# Patient Record
Sex: FEMALE | Race: WHITE | Employment: UNEMPLOYED | URBAN - METROPOLITAN AREA
[De-identification: names, ages, dates, MRNs, and addresses within clinical notes are randomized per-mention and may not be internally consistent; named-entity substitution may affect disease eponyms.]

---

## 2017-03-01 LAB
ABO + RH BLD: NORMAL
ABO + RH BLD: NORMAL
BLD GP AB SCN SERPL QL: NORMAL
HBV SURFACE AG SERPL QL IA: NORMAL
RUBELLA ANTIBODY IGG QUANTITATIVE: NORMAL IU/ML
T PALLIDUM IGG SER QL: NORMAL

## 2017-09-12 LAB — GROUP B STREP PCR: NORMAL

## 2017-10-12 ENCOUNTER — HOSPITAL ENCOUNTER (INPATIENT)
Facility: CLINIC | Age: 27
LOS: 3 days | Discharge: HOME HEALTH | End: 2017-10-15
Attending: OBSTETRICS & GYNECOLOGY | Admitting: OBSTETRICS & GYNECOLOGY
Payer: COMMERCIAL

## 2017-10-12 ENCOUNTER — APPOINTMENT (OUTPATIENT)
Dept: ULTRASOUND IMAGING | Facility: CLINIC | Age: 27
End: 2017-10-12
Attending: OBSTETRICS & GYNECOLOGY
Payer: COMMERCIAL

## 2017-10-12 PROCEDURE — 76815 OB US LIMITED FETUS(S): CPT

## 2017-10-12 PROCEDURE — 12000029 ZZH R&B OB INTERMEDIATE

## 2017-10-12 PROCEDURE — 25000132 ZZH RX MED GY IP 250 OP 250 PS 637: Performed by: OBSTETRICS & GYNECOLOGY

## 2017-10-12 PROCEDURE — S0191 MISOPROSTOL, ORAL, 200 MCG: HCPCS | Performed by: OBSTETRICS & GYNECOLOGY

## 2017-10-12 RX ORDER — FERROUS SULFATE 325(65) MG
325 TABLET ORAL
COMMUNITY

## 2017-10-12 RX ORDER — PRENATAL VIT/IRON FUM/FOLIC AC 27MG-0.8MG
1 TABLET ORAL DAILY
COMMUNITY

## 2017-10-12 RX ORDER — HYDROXYZINE HYDROCHLORIDE 50 MG/1
50-100 TABLET, FILM COATED ORAL ONCE
Status: DISCONTINUED | OUTPATIENT
Start: 2017-10-12 | End: 2017-10-15 | Stop reason: HOSPADM

## 2017-10-12 RX ORDER — MISOPROSTOL 100 UG/1
25 TABLET ORAL
Status: DISCONTINUED | OUTPATIENT
Start: 2017-10-12 | End: 2017-10-13

## 2017-10-12 RX ADMIN — Medication 25 MCG: at 20:44

## 2017-10-12 RX ADMIN — Medication 25 MCG: at 18:52

## 2017-10-12 RX ADMIN — Medication 25 MCG: at 22:49

## 2017-10-12 RX ADMIN — Medication 25 MCG: at 16:44

## 2017-10-12 NOTE — IP AVS SNAPSHOT
MRN:[id number]                      After Visit Summary   10/12/2017    Brooke Vidal    MRN: [id number]           Thank you!     Thank you for choosing [facility] for your care. Our goal is always to provide you with excellent care. Hearing back from our patients is one way we can continue to improve our services. Please take a few minutes to complete the written survey that you may receive in the mail after you visit with us. Thank you!        Patient Information     Date Of Birth          1990        About your hospital stay     You were admitted on:  October 12, 2017 You last received care in the:  [address]    You were discharged on:  October 15, 2017       Who to Call     For medical emergencies, please call 911.  For non-urgent questions about your medical care, please call your primary care provider or clinic, [phone]          Attending Provider     Provider Specialty    Krista Stanley MD OB/Gyn    Marisel, Mychal De La Fuente MD OB/Gyn       Primary Care Provider Office Phone # Fax #    Mychal Joiner [phone]      Further instructions from your care team       Discharge Instructions    You should set up an appointment to see your doctor in 6 weeks after delivery for a postpartum exam.   You should also call if you develop any heavy vaginal bleeding worse than a menstrual period, or fever over 100.5 degrees, or vomiting or increased pain.    You should abstain from intercourse for six weeks after delivery. You should avoid a bath for 1 week after delivery but showering is ok. If you have any questions about yourself or the baby, feel free to call or if any urgent concerns after hours, please go to the emergency room.    Krista Stanley MD     Postpartum Vaginal Delivery Instructions    Activity       Ask family and friends for help when you need it.    Do not place anything in your vagina for 6 weeks.    You  are not restricted on other activities, but take it easy for a few weeks to allow your body to recover from delivery.  You are able to do any activities you feel up to that point.    No driving until you have stopped taking your pain medications (usually two weeks after delivery).     Call your health care provider if you have any of these symptoms:       Increased pain, swelling, redness, or fluid around your stiches from an episiotomy or perineal tear.    A fever above 100.4 F (38 C) with or without chills when placing a thermometer under your tongue.    You soak a sanitary pad with blood within 1 hour, or you see blood clots larger than a golf ball.    Bleeding that lasts more than 6 weeks.    Vaginal discharge that smells bad.    Severe pain, cramping or tenderness in your lower belly area.    A need to urinate more frequently (use the toilet more often), more urgently (use the toilet very quickly), or it burns when you urinate.    Nausea and vomiting.    Redness, swelling or pain around a vein in your leg.    Problems breastfeeding or a red or painful area on your breast.    Chest pain and cough or are gasping for air.    Problems coping with sadness, anxiety, or depression.  If you have any concerns about hurting yourself or the baby, call your provider immediately.     You have questions or concerns after you return home.     Keep your hands clean:  Always wash your hands before touching your perineal area and stitches.  This helps reduce your risk of infection.  If your hands aren't dirty, you may use an alcohol hand-rub to clean your hands. Keep your nails clean and short.        Pending Results     No orders found from 10/10/2017 to 10/13/2017.            Statement of Approval     Ordered          10/15/17 1028  I have reviewed and agree with all the recommendations and orders detailed in this document.  EFFECTIVE NOW     Approved and electronically signed by:  Krista Stanley MD            "  Admission Information     Date & Time Provider Department Dept. Phone    10/12/2017 Mychal Joiner MD [address] [phone]      Your Vitals Were     Blood Pressure Pulse Temperature Respirations Last Period Pulse Oximetry    113/70 77 98.7  F (37.1  C) (Oral) 16 2017 98%      MyChart Information     [facility] lets you send messages to your doctor, view your test results, renew your prescriptions, schedule appointments and more. To sign up, go to [email or web address] . Click on \"Log in\" on the left side of the screen, which will take you to the Welcome page. Then click on \"Sign up Now\" on the right side of the page.     You will be asked to enter the access code listed below, as well as some personal information. Please follow the directions to create your username and password.     Your access code is: [email or web address]  Expires: 2018 11:51 AM     Your access code will  in 90 days. If you need help or a new code, please call your [facility] clinic or [phone].        Care EveryWhere ID     This is your Care EveryWhere ID. This could be used by other organizations to access your [facility] medical records  [id number]        Equal Access to Services     ANNA JORDAN AH: Hadruddy bettso Sopolly, waaxda luqadaha, qaybta kaalmada adeegyada, fiona wright . So [facility] [phone].    ATENCIÓN: Si habla español, tiene a villarreal disposición servicios gratuitos de asistencia lingüística. Ramakrishnaame al [phone].    We comply with applicable federal civil rights laws and [place] laws. We do not discriminate on the basis of race, color, national origin, age, disability, sex, sexual orientation, or gender identity.               Review of your medicines      START taking        Dose / Directions    ibuprofen 600 MG tablet   Commonly known as:  ADVIL/MOTRIN        Dose:  600 mg   Take 1 tablet (600 mg) by mouth every 6 hours as needed for moderate " pain   Quantity:  90 tablet   Refills:  1         CONTINUE these medicines which have NOT CHANGED        Dose / Directions    ferrous sulfate 325 (65 FE) MG tablet   Commonly known as:  IRON        Dose:  325 mg   Take 325 mg by mouth daily (with breakfast)   Refills:  0       prenatal multivitamin plus iron 27-0.8 MG Tabs per tablet        Dose:  1 tablet   Take 1 tablet by mouth daily   Refills:  0            Where to get your medicines      Some of these will need a paper prescription and others can be bought over the counter. Ask your nurse if you have questions.     Bring a paper prescription for each of these medications     ibuprofen 600 MG tablet                Protect others around you: Learn how to safely use, store and throw away your medicines at [email or web address].             Medication List: This is a list of all your medications and when to take them. Check marks below indicate your daily home schedule. Keep this list as a reference.      Medications           Morning Afternoon Evening Bedtime As Needed    ferrous sulfate 325 (65 FE) MG tablet   Commonly known as:  IRON   Take 325 mg by mouth daily (with breakfast)                                ibuprofen 600 MG tablet   Commonly known as:  ADVIL/MOTRIN   Take 1 tablet (600 mg) by mouth every 6 hours as needed for moderate pain   Last time this was given:  800 mg on 10/15/2017 12:09 PM   Next Dose Due:  6:09 pm                                    prenatal multivitamin plus iron 27-0.8 MG Tabs per tablet   Take 1 tablet by mouth daily

## 2017-10-12 NOTE — IP AVS SNAPSHOT
[address]    [place] [address]    Phone:  [phone]                                       After Visit Summary   10/12/2017    Brooke Vidal    MRN: [id number]           After Visit Summary Signature Page     I have received my discharge instructions, and my questions have been answered. I have discussed any challenges I see with this plan with the nurse or doctor.    ..........................................................................................................................................  Patient/Patient Representative Signature      ..........................................................................................................................................  Patient Representative Print Name and Relationship to Patient    ..................................................               ................................................  Date                                            Time    ..........................................................................................................................................  Reviewed by Signature/Title    ...................................................              ..............................................  Date                                                            Time

## 2017-10-13 ENCOUNTER — ANESTHESIA (OUTPATIENT)
Dept: OBGYN | Facility: CLINIC | Age: 27
End: 2017-10-13
Payer: COMMERCIAL

## 2017-10-13 ENCOUNTER — ANESTHESIA EVENT (OUTPATIENT)
Dept: OBGYN | Facility: CLINIC | Age: 27
End: 2017-10-13
Payer: COMMERCIAL

## 2017-10-13 LAB
ABO + RH BLD: NORMAL
ABO + RH BLD: NORMAL
BLOOD BANK CMNT PATIENT-IMP: NORMAL
BLOOD BANK CMNT PATIENT-IMP: NORMAL
SPECIMEN EXP DATE BLD: NORMAL
T PALLIDUM IGG+IGM SER QL: NEGATIVE

## 2017-10-13 PROCEDURE — 86900 BLOOD TYPING SEROLOGIC ABO: CPT | Performed by: OBSTETRICS & GYNECOLOGY

## 2017-10-13 PROCEDURE — 25000132 ZZH RX MED GY IP 250 OP 250 PS 637: Performed by: OBSTETRICS & GYNECOLOGY

## 2017-10-13 PROCEDURE — 72200001 ZZH LABOR CARE VAGINAL DELIVERY SINGLE

## 2017-10-13 PROCEDURE — 86901 BLOOD TYPING SEROLOGIC RH(D): CPT | Performed by: OBSTETRICS & GYNECOLOGY

## 2017-10-13 PROCEDURE — 25000128 H RX IP 250 OP 636: Performed by: ANESTHESIOLOGY

## 2017-10-13 PROCEDURE — 37000011 ZZH ANESTHESIA WARD SERVICE

## 2017-10-13 PROCEDURE — 3E0R3BZ INTRODUCTION OF ANESTHETIC AGENT INTO SPINAL CANAL, PERCUTANEOUS APPROACH: ICD-10-PCS | Performed by: ANESTHESIOLOGY

## 2017-10-13 PROCEDURE — 0KQM0ZZ REPAIR PERINEUM MUSCLE, OPEN APPROACH: ICD-10-PCS | Performed by: OBSTETRICS & GYNECOLOGY

## 2017-10-13 PROCEDURE — 86780 TREPONEMA PALLIDUM: CPT | Performed by: OBSTETRICS & GYNECOLOGY

## 2017-10-13 PROCEDURE — 00HU33Z INSERTION OF INFUSION DEVICE INTO SPINAL CANAL, PERCUTANEOUS APPROACH: ICD-10-PCS | Performed by: ANESTHESIOLOGY

## 2017-10-13 PROCEDURE — 36415 COLL VENOUS BLD VENIPUNCTURE: CPT | Performed by: OBSTETRICS & GYNECOLOGY

## 2017-10-13 PROCEDURE — 25000125 ZZHC RX 250: Performed by: OBSTETRICS & GYNECOLOGY

## 2017-10-13 PROCEDURE — 12000037 ZZH R&B POSTPARTUM INTERMEDIATE

## 2017-10-13 PROCEDURE — 25000128 H RX IP 250 OP 636: Performed by: OBSTETRICS & GYNECOLOGY

## 2017-10-13 RX ORDER — NALOXONE HYDROCHLORIDE 0.4 MG/ML
.1-.4 INJECTION, SOLUTION INTRAMUSCULAR; INTRAVENOUS; SUBCUTANEOUS
Status: DISCONTINUED | OUTPATIENT
Start: 2017-10-13 | End: 2017-10-13

## 2017-10-13 RX ORDER — ONDANSETRON 4 MG/1
4 TABLET, ORALLY DISINTEGRATING ORAL EVERY 6 HOURS PRN
Status: DISCONTINUED | OUTPATIENT
Start: 2017-10-13 | End: 2017-10-13

## 2017-10-13 RX ORDER — ACETAMINOPHEN 325 MG/1
650 TABLET ORAL EVERY 4 HOURS PRN
Status: DISCONTINUED | OUTPATIENT
Start: 2017-10-13 | End: 2017-10-15 | Stop reason: HOSPADM

## 2017-10-13 RX ORDER — LANOLIN 100 %
OINTMENT (GRAM) TOPICAL
Status: DISCONTINUED | OUTPATIENT
Start: 2017-10-13 | End: 2017-10-15 | Stop reason: HOSPADM

## 2017-10-13 RX ORDER — OXYCODONE AND ACETAMINOPHEN 5; 325 MG/1; MG/1
1 TABLET ORAL
Status: DISCONTINUED | OUTPATIENT
Start: 2017-10-13 | End: 2017-10-13

## 2017-10-13 RX ORDER — ROPIVACAINE HYDROCHLORIDE 2 MG/ML
10 INJECTION, SOLUTION EPIDURAL; INFILTRATION; PERINEURAL ONCE
Status: COMPLETED | OUTPATIENT
Start: 2017-10-13 | End: 2017-10-13

## 2017-10-13 RX ORDER — ONDANSETRON 2 MG/ML
4 INJECTION INTRAMUSCULAR; INTRAVENOUS EVERY 6 HOURS PRN
Status: DISCONTINUED | OUTPATIENT
Start: 2017-10-13 | End: 2017-10-13

## 2017-10-13 RX ORDER — LIDOCAINE 40 MG/G
CREAM TOPICAL
Status: DISCONTINUED | OUTPATIENT
Start: 2017-10-13 | End: 2017-10-13

## 2017-10-13 RX ORDER — AMOXICILLIN 250 MG
1-2 CAPSULE ORAL 2 TIMES DAILY
Status: DISCONTINUED | OUTPATIENT
Start: 2017-10-13 | End: 2017-10-15 | Stop reason: HOSPADM

## 2017-10-13 RX ORDER — HYDROCORTISONE 2.5 %
CREAM (GRAM) TOPICAL 3 TIMES DAILY PRN
Status: DISCONTINUED | OUTPATIENT
Start: 2017-10-13 | End: 2017-10-15 | Stop reason: HOSPADM

## 2017-10-13 RX ORDER — SODIUM CHLORIDE, SODIUM LACTATE, POTASSIUM CHLORIDE, CALCIUM CHLORIDE 600; 310; 30; 20 MG/100ML; MG/100ML; MG/100ML; MG/100ML
INJECTION, SOLUTION INTRAVENOUS CONTINUOUS
Status: DISCONTINUED | OUTPATIENT
Start: 2017-10-13 | End: 2017-10-13

## 2017-10-13 RX ORDER — HYDROCODONE BITARTRATE AND ACETAMINOPHEN 5; 325 MG/1; MG/1
1-2 TABLET ORAL EVERY 4 HOURS PRN
Status: DISCONTINUED | OUTPATIENT
Start: 2017-10-13 | End: 2017-10-15 | Stop reason: HOSPADM

## 2017-10-13 RX ORDER — BISACODYL 10 MG
10 SUPPOSITORY, RECTAL RECTAL DAILY PRN
Status: DISCONTINUED | OUTPATIENT
Start: 2017-10-15 | End: 2017-10-15 | Stop reason: HOSPADM

## 2017-10-13 RX ORDER — EPHEDRINE SULFATE 50 MG/ML
5 INJECTION, SOLUTION INTRAMUSCULAR; INTRAVENOUS; SUBCUTANEOUS
Status: DISCONTINUED | OUTPATIENT
Start: 2017-10-13 | End: 2017-10-13

## 2017-10-13 RX ORDER — OXYTOCIN/0.9 % SODIUM CHLORIDE 30/500 ML
1-24 PLASTIC BAG, INJECTION (ML) INTRAVENOUS CONTINUOUS
Status: DISCONTINUED | OUTPATIENT
Start: 2017-10-13 | End: 2017-10-13

## 2017-10-13 RX ORDER — IBUPROFEN 400 MG/1
400-800 TABLET, FILM COATED ORAL EVERY 6 HOURS PRN
Status: DISCONTINUED | OUTPATIENT
Start: 2017-10-13 | End: 2017-10-15 | Stop reason: HOSPADM

## 2017-10-13 RX ORDER — METHYLERGONOVINE MALEATE 0.2 MG/ML
200 INJECTION INTRAVENOUS
Status: DISCONTINUED | OUTPATIENT
Start: 2017-10-13 | End: 2017-10-13

## 2017-10-13 RX ORDER — OXYTOCIN/0.9 % SODIUM CHLORIDE 30/500 ML
100 PLASTIC BAG, INJECTION (ML) INTRAVENOUS CONTINUOUS
Status: DISCONTINUED | OUTPATIENT
Start: 2017-10-13 | End: 2017-10-15 | Stop reason: HOSPADM

## 2017-10-13 RX ORDER — ACETAMINOPHEN 325 MG/1
650 TABLET ORAL EVERY 4 HOURS PRN
Status: DISCONTINUED | OUTPATIENT
Start: 2017-10-13 | End: 2017-10-13

## 2017-10-13 RX ORDER — OXYTOCIN 10 [USP'U]/ML
10 INJECTION, SOLUTION INTRAMUSCULAR; INTRAVENOUS
Status: DISCONTINUED | OUTPATIENT
Start: 2017-10-13 | End: 2017-10-13

## 2017-10-13 RX ORDER — OXYTOCIN/0.9 % SODIUM CHLORIDE 30/500 ML
100-340 PLASTIC BAG, INJECTION (ML) INTRAVENOUS CONTINUOUS PRN
Status: DISCONTINUED | OUTPATIENT
Start: 2017-10-13 | End: 2017-10-13

## 2017-10-13 RX ORDER — MISOPROSTOL 200 UG/1
400 TABLET ORAL
Status: DISCONTINUED | OUTPATIENT
Start: 2017-10-13 | End: 2017-10-15 | Stop reason: HOSPADM

## 2017-10-13 RX ORDER — OXYTOCIN 10 [USP'U]/ML
10 INJECTION, SOLUTION INTRAMUSCULAR; INTRAVENOUS
Status: DISCONTINUED | OUTPATIENT
Start: 2017-10-13 | End: 2017-10-15 | Stop reason: HOSPADM

## 2017-10-13 RX ORDER — NALBUPHINE HYDROCHLORIDE 10 MG/ML
2.5-5 INJECTION, SOLUTION INTRAMUSCULAR; INTRAVENOUS; SUBCUTANEOUS EVERY 6 HOURS PRN
Status: DISCONTINUED | OUTPATIENT
Start: 2017-10-13 | End: 2017-10-13

## 2017-10-13 RX ORDER — HYDROMORPHONE HYDROCHLORIDE 1 MG/ML
.3-.5 INJECTION, SOLUTION INTRAMUSCULAR; INTRAVENOUS; SUBCUTANEOUS EVERY 30 MIN PRN
Status: DISCONTINUED | OUTPATIENT
Start: 2017-10-13 | End: 2017-10-15 | Stop reason: HOSPADM

## 2017-10-13 RX ORDER — CARBOPROST TROMETHAMINE 250 UG/ML
250 INJECTION, SOLUTION INTRAMUSCULAR
Status: DISCONTINUED | OUTPATIENT
Start: 2017-10-13 | End: 2017-10-13

## 2017-10-13 RX ORDER — NALOXONE HYDROCHLORIDE 0.4 MG/ML
.1-.4 INJECTION, SOLUTION INTRAMUSCULAR; INTRAVENOUS; SUBCUTANEOUS
Status: DISCONTINUED | OUTPATIENT
Start: 2017-10-13 | End: 2017-10-15 | Stop reason: HOSPADM

## 2017-10-13 RX ORDER — IBUPROFEN 400 MG/1
800 TABLET, FILM COATED ORAL
Status: DISCONTINUED | OUTPATIENT
Start: 2017-10-13 | End: 2017-10-13

## 2017-10-13 RX ORDER — OXYTOCIN/0.9 % SODIUM CHLORIDE 30/500 ML
340 PLASTIC BAG, INJECTION (ML) INTRAVENOUS CONTINUOUS PRN
Status: DISCONTINUED | OUTPATIENT
Start: 2017-10-13 | End: 2017-10-15 | Stop reason: HOSPADM

## 2017-10-13 RX ADMIN — Medication 12 ML/HR: at 02:13

## 2017-10-13 RX ADMIN — IBUPROFEN 800 MG: 400 TABLET ORAL at 18:47

## 2017-10-13 RX ADMIN — SODIUM CHLORIDE, POTASSIUM CHLORIDE, SODIUM LACTATE AND CALCIUM CHLORIDE 1000 ML: 600; 310; 30; 20 INJECTION, SOLUTION INTRAVENOUS at 01:00

## 2017-10-13 RX ADMIN — SENNOSIDES AND DOCUSATE SODIUM 1 TABLET: 8.6; 5 TABLET ORAL at 19:39

## 2017-10-13 RX ADMIN — ACETAMINOPHEN 650 MG: 325 TABLET, FILM COATED ORAL at 19:39

## 2017-10-13 RX ADMIN — OXYTOCIN-SODIUM CHLORIDE 0.9% IV SOLN 30 UNIT/500ML 2 MILLI-UNITS/MIN: 30-0.9/5 SOLUTION at 02:40

## 2017-10-13 RX ADMIN — SODIUM CHLORIDE, POTASSIUM CHLORIDE, SODIUM LACTATE AND CALCIUM CHLORIDE: 600; 310; 30; 20 INJECTION, SOLUTION INTRAVENOUS at 02:13

## 2017-10-13 RX ADMIN — ROPIVACAINE HYDROCHLORIDE 10 ML: 2 INJECTION, SOLUTION EPIDURAL; INFILTRATION at 01:54

## 2017-10-13 NOTE — PROGRESS NOTES
Data: Brooke Vidal transferred to [facility] via wheelchair at 1200. Baby transferred via parent's arms.  Action: Receiving unit notified of transfer: Yes. Patient and family notified of room change. Report given to Brenda Moralez at 1205. Belongings sent to receiving unit. Accompanied by Registered Nurse. Oriented patient to surroundings. Call light within reach. ID bands double-checked with receiving RN.  Response: Patient tolerated transfer and is stable. Ambulated well and able to void.

## 2017-10-13 NOTE — PLAN OF CARE
Dr. Joiner calls in- updated on patient status of feeling crampy.  Plan- continue PO cytotec and reevaluate in AM.  Sleep and pain meds when indicated.  Call for questions or concerns.

## 2017-10-13 NOTE — LACTATION NOTE
Initial Lactation visit. Hand out given. Recommend unlimited, frequent breast feedings: At least 8 - 12 times every 24 hours. Avoid pacifiers and supplementation with formula unless medically indicated. Explained benefits of holding baby skin on skin to help promote better breastfeeding outcomes. Infant feeding at time of visit.  Assisted Brooke with getting baby to latch better as she was shallow.  Encouraged Brooke to hold both her breast and baby to get her to latch better initially.  Will revisit as needed.    Erna Colorado RN, IBCLC

## 2017-10-13 NOTE — PLAN OF CARE
Problem: Patient Care Overview  Goal: Plan of Care/Patient Progress Review  Outcome: No Change  Pt. admitted from L&D  via Wheelchair  and transferred to bed with sba. Pt. arrived with baby and was accompanied by  and arrived with personal belongings. Report was taken from Beryl Saenz rn in L&D. VS stable. Fundus is firm and midline.  Vaginal bleeding is scant.  SL in left forearm.  Pt. oriented to the room and call light system.

## 2017-10-13 NOTE — PROGRESS NOTES
of viable male at 0857   Baby's weight 9lbs. 13oz.     Apgars 8,9,      Placenta intact, 3vc   2nd degree lac repaired with 3-0 vicryl   Epidural anesthesia   No complications

## 2017-10-13 NOTE — PLAN OF CARE
ZOILA paged for epidural at 0130. Dr. Angulo in room at 0140. Medications, Ropivacaine, handed off to ZOILA at this time.  Time out performed.  Pt sat up at edge of bed. Ana in place during procedure. Pt tolerated procedure well. Into left tilt position at 0200. BP set for every 2 minutes with continuous pulse oximeter in place.

## 2017-10-13 NOTE — H&P
[facility] Labor and Delivery History and Physical    Brooke Vidal MRN# [id number]   Age: 27 year old YOB: 1990     Date of Admission:  10/12/2017    Primary care provider: Mychal Joiner           Chief Complaint:   Brooke Vidal is a 27 year old female who is 40w2d pregnant and being admitted for PROM. She received PO cytotec 25 mcg every 2 hours until about 1 am then was started on pitocin at 1 am. She reached a max dose of 8 mu and had an epidural at around 2 am. She is now complete.          Pregnancy history:     OBSTETRIC HISTORY:    Obstetric History       T0      L0     SAB0   TAB0   Ectopic0   Multiple0   Live Births0       # Outcome Date GA Lbr Brennon/2nd Weight Sex Delivery Anes PTL Lv   1 Current                   EDC: Estimated Date of Delivery: Oct 11, 2017    Prenatal Labs:   Lab Results   Component Value Date    ABO A 10/13/2017    RH Neg 10/13/2017    AS NEG 2017    HEPBANG NEG 2017    TREPAB NR 2017       GBS Status:   Lab Results   Component Value Date    GBS NEG 2017       Active Problem List  Patient Active Problem List   Diagnosis     Indication for care in labor or delivery       Medication Prior to Admission  Prescriptions Prior to Admission   Medication Sig Dispense Refill Last Dose     Prenatal Vit-Fe Fumarate-FA (PRENATAL MULTIVITAMIN PLUS IRON) 27-0.8 MG TABS per tablet Take 1 tablet by mouth daily        ferrous sulfate (IRON) 325 (65 FE) MG tablet Take 325 mg by mouth daily (with breakfast)      .        Maternal Past Medical History:   History reviewed. No pertinent past medical history.                    Family History:   This patient has no significant family history            Social History:     Social History   Substance Use Topics     Smoking status: Never Smoker     Smokeless tobacco: Never Used     Alcohol use No            Review of Systems:   C: NEGATIVE for fever, chills, change in weight  E/M:  NEGATIVE for ear, mouth and throat problems  R: NEGATIVE for significant cough or SOB  CV: NEGATIVE for chest pain, palpitations or peripheral edema          Physical Exam:   Vitals were reviewed    Constitutional: Awake, alert, cooperative, no apparent distress, and appears stated age.  Lungs: No increased work of breathing, good air exchange, clear to auscultation bilaterally, no crackles or wheezing.  Cardiovascular: Regular rate and rhythm, normal S1 and S2, no S3 or S4, and no murmur noted.  Abdomen: No scars, normal bowel sounds, soft, non-distended, non-tender, no masses palpated, no hepatosplenomegally.  Genitourinary: No urethral discharge, normal external genitalia, no hernia.  Neuropsychiatric: Normal affect, mood, orientation, memory and insight.  Skin: No rashes, erythema, pallor, petechia or purpura.     Cervix:   Membranes: PROM   Dilation: 10   Effacement: 100%   Station:+2    Presentation:Vertex  Fetal Heart Rate Tracing: Tier 1 (normal)  Tocometer: external monitor                       Assessment:   Brooke Vidal is a 40w2d pregnant female admitted with induction of labor, indication PROM.          Plan:   Anticipate     Mychal Joiner MD

## 2017-10-13 NOTE — ANESTHESIA PROCEDURE NOTES
Peripheral nerve/Neuraxial procedure note : epidural catheter  Pre-Procedure  Performed by TANA ALONSO  Referred by KANDIS SOSA  Location: OB      Pre-Anesthestic Checklist: patient identified, IV checked, risks and benefits discussed, informed consent, monitors and equipment checked, pre-op evaluation and at physician/surgeon's request    Timeout  Correct Patient: Yes   Correct Procedure: Yes   Correct Site: Yes   Correct Laterality: N/A   Correct Position: Yes   Site Marked: N/A   .   Procedure Documentation    .    Procedure:    Epidural catheter.  Insertion Site:L2-3  (midline approach) Injection technique: LORT saline   Local skin infiltrated with mL of 1% lidocaine.  JEANNA at 4 cm     Patient Prep;mask, sterile gloves, povidone-iodine 7.5% surgical scrub, patient draped.  .  Needle: Touhy needle Needle Gauge: 17.    Needle Length (Inches) 3.5  # of attempts: 1 and # of redirects:  .   . .  Catheter threaded easily  3 cm epidural space.  .   .    Assessment/Narrative  Paresthesias: No.  .  .  Aspiration negative for heme or CSF  . Test dose of 3 mL lidocaine 1.5% w/ 1:200,000 epinephrine at. Test dose negative for signs of intravascular, subdural or intrathecal injection. Comments:  No complications. Sterile Dressing.

## 2017-10-13 NOTE — PLAN OF CARE
Problem: Labor (Cervical Ripen, Induct, Augment) (Adult,Obstetrics,Pediatric)  Goal: Signs and Symptoms of Listed Potential Problems Will be Absent, Minimized or Managed (Labor)  Signs and symptoms of listed potential problems will be absent, minimized or managed by discharge/transition of care (reference Labor (Cervical Ripen, Induct, Augment) (Adult,Obstetrics,Pediatric) CPG).   Outcome: Improving  Pt. Resting comfortably with epidural. SVE: 8/90/0, Category II tracing, with periods of marked variability, FHT 100s-140s and intermittent early decels. Pitocin @ 8mu.

## 2017-10-14 PROCEDURE — 25000132 ZZH RX MED GY IP 250 OP 250 PS 637: Performed by: OBSTETRICS & GYNECOLOGY

## 2017-10-14 PROCEDURE — 12000037 ZZH R&B POSTPARTUM INTERMEDIATE

## 2017-10-14 RX ADMIN — ACETAMINOPHEN 650 MG: 325 TABLET, FILM COATED ORAL at 00:18

## 2017-10-14 RX ADMIN — ACETAMINOPHEN 650 MG: 325 TABLET, FILM COATED ORAL at 17:34

## 2017-10-14 RX ADMIN — ACETAMINOPHEN 650 MG: 325 TABLET, FILM COATED ORAL at 11:59

## 2017-10-14 RX ADMIN — IBUPROFEN 800 MG: 400 TABLET ORAL at 17:34

## 2017-10-14 RX ADMIN — ACETAMINOPHEN 650 MG: 325 TABLET, FILM COATED ORAL at 05:48

## 2017-10-14 RX ADMIN — IBUPROFEN 800 MG: 400 TABLET ORAL at 23:13

## 2017-10-14 RX ADMIN — SENNOSIDES AND DOCUSATE SODIUM 1 TABLET: 8.6; 5 TABLET ORAL at 09:06

## 2017-10-14 RX ADMIN — IBUPROFEN 800 MG: 400 TABLET ORAL at 05:48

## 2017-10-14 RX ADMIN — IBUPROFEN 800 MG: 400 TABLET ORAL at 11:59

## 2017-10-14 RX ADMIN — ACETAMINOPHEN 650 MG: 325 TABLET, FILM COATED ORAL at 23:13

## 2017-10-14 RX ADMIN — IBUPROFEN 800 MG: 400 TABLET ORAL at 00:18

## 2017-10-14 NOTE — PROGRESS NOTES
[facility]       DAILY NOTE - POSTPARTUM DAY 1     SUBJECTIVE:     Pain controlled? Yes  Tolerating a regular diet? YES  Ambulating? YES  Voiding without difficulty? Yes    OBJECTIVE:  Vitals:    10/13/17 1200 10/13/17 1500 10/13/17 2211 10/14/17 0903   BP: 117/84 114/77 118/77 104/57   Pulse:  94  62   Resp: 18 18 18 16   Temp: 98.2  F (36.8  C) 98.2  F (36.8  C) 97.7  F (36.5  C) 98.3  F (36.8  C)   TempSrc: Oral Oral Oral Oral   SpO2:           Constitutional: healthy, alert and no distress    Abdomen:  Uterine fundus is firm, non-tender and at the level of the umbilicus       LABS:  No results found for: HGB    ASSESSMENT:  Post-partum day #1 s/p   Pregnancy complicated by NO COMPLICATIONS    Doing well.       PLAN:   Continue routine postpartum cares    Mychal Joiner

## 2017-10-14 NOTE — PLAN OF CARE
Problem: Patient Care Overview  Goal: Plan of Care/Patient Progress Review  Outcome: Improving  Vss, fundus firm with scant flow. Wearing ice and tucks to juanpablo area. Pain managed with tylenol and ibuprofen. Breast feeding  well and pumping after feedings. Encouraged to call with questions/needs.

## 2017-10-14 NOTE — LACTATION NOTE
Follow up visit. Infant sleepy overnight.  Started supplement overnight as blood sugars were lower.  Latched and fed well this morning.  Encouraged Brooke to pump until not needing to supplement.  Will continue to follow as needed.  Erna Colorado  RN, IBCLC

## 2017-10-14 NOTE — PLAN OF CARE
Problem: Patient Care Overview  Goal: Plan of Care/Patient Progress Review  Outcome: Improving  Patient doing well overnight. Taking tylenol and ibuprofen for cramping. Hot pack given for comfort. Using ice and tucks on perineum. Breast feeding infant and pumping afterward.

## 2017-10-14 NOTE — ANESTHESIA POSTPROCEDURE EVALUATION
Patient: Brooke Vidal    * No procedures listed *    Diagnosis:* No pre-op diagnosis entered *  Diagnosis Additional Information: No value filed.    Anesthesia Type:  No value filed.    Note:  Anesthesia Post Evaluation    Patient location during evaluation: floor  Patient participation: Able to fully participate in evaluation  Level of consciousness: awake and alert  Pain management: adequate  Airway patency: patent  Cardiovascular status: acceptable  Respiratory status: acceptable     Anesthetic complications: None    Comments: Epidural has worn off and no complications        Last vitals:  Vitals:    10/13/17 1500 10/13/17 2211 10/14/17 0903   BP: 114/77 118/77 104/57   Pulse: 94  62   Resp: 18 18 16   Temp: 36.8  C (98.2  F) 36.5  C (97.7  F) 36.8  C (98.3  F)   SpO2:            Electronically Signed By: Andres Suero MD  October 14, 2017  1:42 PM

## 2017-10-15 VITALS
RESPIRATION RATE: 16 BRPM | HEART RATE: 77 BPM | TEMPERATURE: 98.7 F | SYSTOLIC BLOOD PRESSURE: 113 MMHG | OXYGEN SATURATION: 98 % | DIASTOLIC BLOOD PRESSURE: 70 MMHG

## 2017-10-15 PROCEDURE — 25000132 ZZH RX MED GY IP 250 OP 250 PS 637: Performed by: OBSTETRICS & GYNECOLOGY

## 2017-10-15 RX ORDER — IBUPROFEN 600 MG/1
600 TABLET, FILM COATED ORAL EVERY 6 HOURS PRN
Qty: 90 TABLET | Refills: 1 | Status: ON HOLD | OUTPATIENT
Start: 2017-10-15 | End: 2020-06-17

## 2017-10-15 RX ADMIN — IBUPROFEN 800 MG: 400 TABLET ORAL at 05:07

## 2017-10-15 RX ADMIN — IBUPROFEN 800 MG: 400 TABLET ORAL at 12:09

## 2017-10-15 RX ADMIN — ACETAMINOPHEN 650 MG: 325 TABLET, FILM COATED ORAL at 05:07

## 2017-10-15 RX ADMIN — SENNOSIDES AND DOCUSATE SODIUM 1 TABLET: 8.6; 5 TABLET ORAL at 08:20

## 2017-10-15 RX ADMIN — ACETAMINOPHEN 650 MG: 325 TABLET, FILM COATED ORAL at 12:09

## 2017-10-15 NOTE — PROGRESS NOTES
Brooke Vidal  October 15, 2017  PPD#3 s/p     S: Pt doing well with no acute events overnight.  Pain well controlled;  ambulating without difficulty; tolerating po intake; passing flatus.  Denies SOB, CP, HA, nausea/vomiting, fevers/chills.  Decreased lochia.  Breast/Bottle feeding without difficulty.    O: /70  Pulse 77  Temp 98.7  F (37.1  C) (Oral)  Resp 16  LMP 2017  SpO2 98%  Breastfeeding? Unknown  No results for input(s): HGB in the last 168 hours.  Abdomen: soft, non tender, fundus firm 2 cm below the umbilicus  Ext: non tender, no edema or erythema    A/P: s/p  PPD #2 - doing well  Continue routine post partum care  Discussed contraceptive options, which will be readdressed at 6 week post-partum appointment.  Discharge planning for today.    Krista Stanley MD

## 2017-10-15 NOTE — DISCHARGE INSTRUCTIONS
Discharge Instructions    You should set up an appointment to see your doctor in 6 weeks after delivery for a postpartum exam.   You should also call if you develop any heavy vaginal bleeding worse than a menstrual period, or fever over 100.5 degrees, or vomiting or increased pain.    You should abstain from intercourse for six weeks after delivery. You should avoid a bath for 1 week after delivery but showering is ok. If you have any questions about yourself or the baby, feel free to call or if any urgent concerns after hours, please go to the emergency room.    Krista Stanley MD     Postpartum Vaginal Delivery Instructions    Activity       Ask family and friends for help when you need it.    Do not place anything in your vagina for 6 weeks.    You are not restricted on other activities, but take it easy for a few weeks to allow your body to recover from delivery.  You are able to do any activities you feel up to that point.    No driving until you have stopped taking your pain medications (usually two weeks after delivery).     Call your health care provider if you have any of these symptoms:       Increased pain, swelling, redness, or fluid around your stiches from an episiotomy or perineal tear.    A fever above 100.4 F (38 C) with or without chills when placing a thermometer under your tongue.    You soak a sanitary pad with blood within 1 hour, or you see blood clots larger than a golf ball.    Bleeding that lasts more than 6 weeks.    Vaginal discharge that smells bad.    Severe pain, cramping or tenderness in your lower belly area.    A need to urinate more frequently (use the toilet more often), more urgently (use the toilet very quickly), or it burns when you urinate.    Nausea and vomiting.    Redness, swelling or pain around a vein in your leg.    Problems breastfeeding or a red or painful area on your breast.    Chest pain and cough or are gasping for air.    Problems coping with sadness,  anxiety, or depression.  If you have any concerns about hurting yourself or the baby, call your provider immediately.     You have questions or concerns after you return home.     Keep your hands clean:  Always wash your hands before touching your perineal area and stitches.  This helps reduce your risk of infection.  If your hands aren't dirty, you may use an alcohol hand-rub to clean your hands. Keep your nails clean and short.

## 2017-10-15 NOTE — PLAN OF CARE
Problem: Patient Care Overview  Goal: Plan of Care/Patient Progress Review  Outcome: No Change  Patient doing well overnight. Taking tylenol and ibuprofen for cramping. Warm packs provided for comfort. Breast feeding infant on demand. Anticipate discharge later today.

## 2017-10-15 NOTE — PLAN OF CARE
Problem: Patient Care Overview  Goal: Plan of Care/Patient Progress Review  Patient meeting expected goals for this shift.  Using ibuprofen, tylenol & heat for pain/comfort.  Independent in all self and  cares.  Working on breastfeeding .   present at bedside and supportive.  Positive bonding behaviors observed.  Continue to monitor and notify MD as needed.

## 2017-10-25 NOTE — L&D DELIVERY NOTE
IDENTIFICATION:  Brooke Fernández is a 27-year-old,  1, para 0, who presents to [facility] on 10/12/2017 with premature rupture of membranes.  This happened at around noon.        HOSPITAL COURSE:  She arrived in the afternoon and was confirmed ruptured.  She was given oral Cytotec 25 mcg every 2 hours starting at about 8:00 p.m.  At around 1:00 a.m., she was started on Pitocin.        She had an uncomplicated prenatal course.  Her were are A negative, antibody negative, RPR nonreactive, HIV negative, rubella immune, hepatitis B negative.  GBS is negative.        She reached a maximum dose of 8 milliunits.  She was complete at around 8:00 a.m.  She pushed for about 1 hour.  The bed was broken down.  Perineum was cleansed.  She had a normal spontaneous vaginal delivery of a male infant at 8:57 a.m.  The weight was 9 pounds 13 ounces, Apgars 8 at 1 minute, 9 at 5 minutes.  She had a secondary laceration which was treated repaired with 3-0 Vicryl in layers.  She had an epidural anesthesia, which worked well for her repair.  Her placenta was delivered spontaneously intact with a 3-vessel cord.  There were no complications.  She and the baby were both stable postpartum.         SHENA MARQUIS MD             D: 10/24/2017 15:22   T: 10/24/2017 22:06   MT: [id number]      Name:     BROOKE FERNÁNDEZ   MRN:      [id number]        Account:        [id number]   :      1990           Delivery Date:  10/13/2017      Document: [id number]

## 2018-01-28 ENCOUNTER — HEALTH MAINTENANCE LETTER (OUTPATIENT)
Age: 28
End: 2018-01-28

## 2019-02-14 NOTE — PLAN OF CARE
LUCENTIS AND REPEAT EVERY 5 WKS X 4 - TRACE SRF AT 5 WKS - IMPROVING COMPARED TO 6 WKS ON 12 11 17. Problem: Patient Care Overview  Goal: Plan of Care/Patient Progress Review  Outcome: Adequate for Discharge Date Met:  10/15/17  D: VSS, assessments WDL.   I: Pt. received complete discharge paperwork and home medications as filled by discharge pharmacy to include prenatal vitamin, iron and ibuprofen.   Pt. was given times of last dose for all discharge medications in writing on discharge medication sheets. Patient renting breat pump. Discharge teaching included home medication, pain management, activity restrictions, postpartum cares, and signs and symptoms of infection.    A: Discharge outcomes on care plan met.  Mother states understanding and comfort with self and baby cares.  P: Pt. discharged to home.  Pt. was discharged with baby, and bands were checked at time of discharge.  Pt. was accompanied by , nurse and baby, and left with personal belongings.  Home care ordered.  Pt. to follow up with OB per MD order.  Pt. had no further questions at the time of discharge and no unmet needs were identified.

## 2019-12-03 LAB
HBV SURFACE AG SERPL QL IA: NEGATIVE
HIV 1+2 AB+HIV1 P24 AG SERPL QL IA: NEGATIVE
RUBELLA ANTIBODY IGG QUANTITATIVE: NORMAL IU/ML

## 2019-12-11 ENCOUNTER — TELEPHONE (OUTPATIENT)
Dept: MATERNAL FETAL MEDICINE | Facility: CLINIC | Age: 29
End: 2019-12-11

## 2019-12-11 ENCOUNTER — OFFICE VISIT (OUTPATIENT)
Dept: MATERNAL FETAL MEDICINE | Facility: CLINIC | Age: 29
End: 2019-12-11
Attending: OBSTETRICS & GYNECOLOGY
Payer: COMMERCIAL

## 2019-12-11 ENCOUNTER — PRE VISIT (OUTPATIENT)
Dept: MATERNAL FETAL MEDICINE | Facility: CLINIC | Age: 29
End: 2019-12-11

## 2019-12-11 ENCOUNTER — HOSPITAL ENCOUNTER (OUTPATIENT)
Dept: ULTRASOUND IMAGING | Facility: CLINIC | Age: 29
Discharge: HOME | End: 2019-12-11
Attending: OBSTETRICS & GYNECOLOGY | Admitting: OBSTETRICS & GYNECOLOGY
Payer: COMMERCIAL

## 2019-12-11 DIAGNOSIS — O28.1 ABNORMAL BIOCHEMICAL FINDING ON ANTENATAL SCREENING OF MOTHER: Primary | ICD-10-CM

## 2019-12-11 DIAGNOSIS — O35.8XX0 CYSTIC HYGROMA OF FETUS IN SINGLETON PREGNANCY: ICD-10-CM

## 2019-12-11 DIAGNOSIS — O26.90 PREGNANCY RELATED CONDITION, ANTEPARTUM: ICD-10-CM

## 2019-12-11 DIAGNOSIS — O35.10X1 MATERNAL CARE FOR SUSPECTED CHROMOSOMAL ABNORMALITY IN FETUS, FETUS 1: ICD-10-CM

## 2019-12-11 PROCEDURE — 96040 ZZH GENETIC COUNSELING, EACH 30 MINUTES: CPT | Mod: ZF | Performed by: GENETIC COUNSELOR, MS

## 2019-12-11 PROCEDURE — 76813 OB US NUCHAL MEAS 1 GEST: CPT

## 2019-12-11 NOTE — PROGRESS NOTES
[facility]  Genetic Counseling Consult    Patient: Brooke Vidal YOB: 1990   Date of Service: 19      Brooke Vidal was seen at [facility] [place] for genetic consultation to discuss the options for screening and testing for fetal chromosome abnormalities.  The indication for genetic counseling is NIPT positive for monosomy X.  Daniel was accompanied to her appointment today by her  Blaine.       Impression/Plan:   1.  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  This test result has a positive predictive value of ~50%.  We discussed a brief overview of the common features associated with Owens syndrome, as well as the increased risk for miscarriage for affected pregnancies.    2.  Daniel had a nuchal translucency ultrasound today which identified a cystic hygroma.  This ultrasound finding has a high association with chromosome abnormalities, in particular Owens syndrome.  Please see ultrasound report for full details.      3.  Daniel and Blaine were undecided regarding appropriate steps moving forward for them.  We discussed options including diagnostic testing and increased ultrasound surveillance.  The couple will contact our clinic to discuss and arrange care as needed.      4.  Daniel completed a consent to communicate with her  Blaine in case he has questions or concerns he would like addressed moving forward, and this document was scanned into her medical record.      Pregnancy History:   /Parity:    Age at Delivery: 30 year old  ÓSCAR: 2020, by Last Menstrual Period  Gestational Age: 12w4d    No significant complications or exposures were reported in the current pregnancy prior to the abnormal NIPT result.    Brooke farmer pregnancy history is significant for 1 prior full term pregnancy with no reported complications.    Risk Assessment for  "Chromosome Conditions:     We reviewed that this Brooke had a Non-Invasive Prenatal Test (NIPT) (Panorama) earlier in pregnancy.  The results are \"ANEUPLOIDY DETECTED\" for Monosomy X (Wilburn Syndrome). The results are normal for 18,13, and 21 (no aneuploidy detected).  We reviewed that NIPT is a screening test, not a diagnostic test, meaning that it does not confirm or rule out any condition.  We discussed that the purpose of NIPT is to identify pregnancies that are at greater risk for having the chromosome conditions assessed.  For a positive NIPT result, there is a statistical calculation that can be used to provide an estimate for the risks for the pregnancy to be affected called a positive predictive value (PPV).  PPV is calculated using the accuracy metrics for the test for that condition (sensitivity and specificity) as well the baseline risks for an affected pregnancy.  For Brooke's NIPT result, the positive predictive value range is 36-50%.  This means that there is a 50% or greater chance that the pregnancy is not affected with monosomy X, and that her result is a false positive.  We discussed that diagnostic testing via amniocentesis or chorionic villus sampling can provide a definitive answer regarding the status of the pregnancy.       We briefly reviewed common features for Wilburn syndrome, as well as the known increased risk for miscarriage associated with this condition.  Brooke reports that she has done some brief internet searches, but had decided to stop until she had a chance to talk with providers in our clinic.  We discussed that Wilburn syndrome is one of the most commonly identified chromosome abnormalities in pregnancy.  There is absolutely nothing that is known to cause Wilburn syndrome, and all conceptions have a small risk for Wilburn syndrome.      We discussed that diagnostic testing is available via amniocentesis or [facility], and reviewed the timelines for these testing options, as well " as the small risk for complication associated with these procedures.  We discussed that if genetic testing confirmed a diagnosis of Wilburn syndrome, it would not be able to predict the severity for that individual or predict specific health concerns.  We discussed that ultrasound surveillance, including today's nuchal translucency measurement can be used to identify concerns and help predict severity in some cases.  We discussed that if today's ultrasound was abnormal, it would not confirm a diagnosis, but it would dramatically increase the risks for the pregnancy to be affected with Wilburn syndrome.  We discussed that any genetic testing we discussed during pregnancy can also be completed at the end of the pregnancy as well.      Brooke and Blaine had very few questions today, and were encouraged to contact genetic counseling moving forward with any questions or concerns.         It was a pleasure to be involved with Brooke s care. Face-to-face time of the meeting was 25 minutes.      Francisco Mullins MS, [facility]  Licensed Genetic Counselor  Phone: [phone]  Pager: [phone]

## 2019-12-11 NOTE — TELEPHONE ENCOUNTER
"12/11/2019    Called Brooke to discuss her referral to [facility] for abnormal NIPT result, increased risk for Monosomy X.      We reviewed that this Brooke had a Non-Invasive Prenatal Test (NIPT) (Panorama) earlier in pregnancy.  The results are \"ANEUPLOIDY DETECTED\" for Monosomy X (Wilburn Syndrome). The results are normal for 18,13, and 21 (no aneuploidy detected).  We reviewed that NIPT is a screening test, not a diagnostic test, meaning that it does not confirm or rule out any condition.  We discussed that the purpose of NIPT is to identify pregnancies that are at greater risk for having the chromosome conditions assessed.  For a positive NIPT result, there is a statistical calculation that can be used to provide an estimate for the risks for the pregnancy to be affected called a positive predictive value (PPV).  PPV is calculated using the accuracy metrics for the test for that condition (sensitivity and specificity) as well the baseline risks for an affected pregnancy.  For Brooke's NIPT result, the positive predictive value range is 36-50%.  This means that there is a 50% or greater chance that the pregnancy is not affected with monosomy X, and that her result is a false positive.  We discussed that diagnostic testing via amniocentesis or chorionic villus sampling can provide a definitive answer regarding the status of the pregnancy.      Options for diagnostic testing, as well as the risks associated with testing, were briefly reviewed, and Brooke reports that she would want to have an ultrasound as soon as possible, and discuss options with her , prior to making a decision regarding diagnostic testing.      We briefly reviewed common features for Wilburn syndrome, as well as the known increased risk for miscarriage associated with this condition.  Brooke reports that she has done some brief internet searches, but had decided to stop until she had a chance to talk with providers in our clinic.  "     We discussed that early an early ultrasound assessment, called a nuchal translucency, can help provide additional information about risks for chromosome conditions such as Wilburn syndrome, and Brooke expressed interest in coordinating this appointment as quickly as possible.      Scheduling will contact Brooke to arrange a NT ultrasound appointment with genetic counseling as soon as possible.     Brooke had no further questions at this time and was provided contact information for genetic counseling and encouraged to contact our clinic with any questions or concerns that arise.     Francisco Mullins MS, [facility]  Licensed Genetic Counselor  Phone: [phone]  Pager: [phone]

## 2019-12-12 NOTE — PROGRESS NOTES
Please see ultrasound report under imaging tab for details on ultrasound performed today.    Renetta Draper MD  , OB/GYN  Maternal-Fetal Medicine  [email or web address]  [phone] (Academic office)  [phone] (Pager)

## 2020-01-08 ENCOUNTER — OFFICE VISIT (OUTPATIENT)
Dept: MATERNAL FETAL MEDICINE | Facility: CLINIC | Age: 30
End: 2020-01-08
Attending: OBSTETRICS & GYNECOLOGY
Payer: COMMERCIAL

## 2020-01-08 ENCOUNTER — HOSPITAL ENCOUNTER (OUTPATIENT)
Dept: ULTRASOUND IMAGING | Facility: CLINIC | Age: 30
Discharge: HOME | End: 2020-01-08
Attending: OBSTETRICS & GYNECOLOGY | Admitting: OBSTETRICS & GYNECOLOGY
Payer: COMMERCIAL

## 2020-01-08 DIAGNOSIS — O28.1 ABNORMAL BIOCHEMICAL FINDING ON ANTENATAL SCREENING OF MOTHER: ICD-10-CM

## 2020-01-08 DIAGNOSIS — O26.90 PREGNANCY RELATED CONDITION, ANTEPARTUM: ICD-10-CM

## 2020-01-08 DIAGNOSIS — O35.10X1 MATERNAL CARE FOR SUSPECTED CHROMOSOMAL ABNORMALITY IN FETUS, FETUS 1: ICD-10-CM

## 2020-01-08 DIAGNOSIS — O35.8XX0 CYSTIC HYGROMA OF FETUS IN SINGLETON PREGNANCY: Primary | ICD-10-CM

## 2020-01-08 PROCEDURE — 76805 OB US >/= 14 WKS SNGL FETUS: CPT

## 2020-01-08 NOTE — PROGRESS NOTES
"Please see \"Imaging\" tab under \"Chart Review\" for details of today's US.    Therese Delatorre, DO    "

## 2020-01-29 ENCOUNTER — HOSPITAL ENCOUNTER (OUTPATIENT)
Dept: ULTRASOUND IMAGING | Facility: CLINIC | Age: 30
Discharge: HOME | End: 2020-01-29
Attending: OBSTETRICS & GYNECOLOGY | Admitting: OBSTETRICS & GYNECOLOGY
Payer: COMMERCIAL

## 2020-01-29 ENCOUNTER — OFFICE VISIT (OUTPATIENT)
Dept: MATERNAL FETAL MEDICINE | Facility: CLINIC | Age: 30
End: 2020-01-29
Attending: OBSTETRICS & GYNECOLOGY
Payer: COMMERCIAL

## 2020-01-29 DIAGNOSIS — O35.8XX0 CYSTIC HYGROMA OF FETUS IN SINGLETON PREGNANCY: Primary | ICD-10-CM

## 2020-01-29 DIAGNOSIS — O28.1 ABNORMAL BIOCHEMICAL FINDING ON ANTENATAL SCREENING OF MOTHER: ICD-10-CM

## 2020-01-29 DIAGNOSIS — O26.90 PREGNANCY RELATED CONDITION, ANTEPARTUM: ICD-10-CM

## 2020-01-29 DIAGNOSIS — O35.10X1 MATERNAL CARE FOR SUSPECTED CHROMOSOMAL ABNORMALITY IN FETUS, FETUS 1: ICD-10-CM

## 2020-01-29 DIAGNOSIS — O35.8XX0 CYSTIC HYGROMA OF FETUS IN SINGLETON PREGNANCY: ICD-10-CM

## 2020-01-29 PROCEDURE — 76816 OB US FOLLOW-UP PER FETUS: CPT

## 2020-01-29 NOTE — PROGRESS NOTES
Please see full imaging report from ViewPoint program under imaging tab.    Willam Mariano MD  Maternal Fetal Medicine

## 2020-02-13 ENCOUNTER — OFFICE VISIT (OUTPATIENT)
Dept: MATERNAL FETAL MEDICINE | Facility: CLINIC | Age: 30
End: 2020-02-13
Attending: OBSTETRICS & GYNECOLOGY
Payer: COMMERCIAL

## 2020-02-13 ENCOUNTER — HOSPITAL ENCOUNTER (OUTPATIENT)
Dept: ULTRASOUND IMAGING | Facility: CLINIC | Age: 30
Discharge: HOME | End: 2020-02-13
Attending: OBSTETRICS & GYNECOLOGY | Admitting: OBSTETRICS & GYNECOLOGY
Payer: COMMERCIAL

## 2020-02-13 DIAGNOSIS — O35.8XX0 CYSTIC HYGROMA OF FETUS IN SINGLETON PREGNANCY: ICD-10-CM

## 2020-02-13 DIAGNOSIS — O28.1 ABNORMAL BIOCHEMICAL FINDING ON ANTENATAL SCREENING OF MOTHER: ICD-10-CM

## 2020-02-13 DIAGNOSIS — O35.10X1 MATERNAL CARE FOR SUSPECTED CHROMOSOMAL ABNORMALITY IN FETUS, FETUS 1: ICD-10-CM

## 2020-02-13 PROCEDURE — 76816 OB US FOLLOW-UP PER FETUS: CPT

## 2020-02-13 NOTE — PROGRESS NOTES
Please see ultrasound report under imaging tab for details on ultrasound performed today.    Renetta Draper MD  , OB/GYN  Maternal-Fetal Medicine  [email or web address]  [phone] (Academic office)  [phone] (Pager)

## 2020-02-20 ENCOUNTER — OFFICE VISIT (OUTPATIENT)
Dept: MATERNAL FETAL MEDICINE | Facility: CLINIC | Age: 30
End: 2020-02-20
Attending: OBSTETRICS & GYNECOLOGY
Payer: COMMERCIAL

## 2020-02-20 ENCOUNTER — HOSPITAL ENCOUNTER (OUTPATIENT)
Dept: CARDIOLOGY | Facility: CLINIC | Age: 30
Discharge: HOME | End: 2020-02-20
Attending: OBSTETRICS & GYNECOLOGY | Admitting: OBSTETRICS & GYNECOLOGY
Payer: COMMERCIAL

## 2020-02-20 ENCOUNTER — HOSPITAL ENCOUNTER (OUTPATIENT)
Dept: ULTRASOUND IMAGING | Facility: CLINIC | Age: 30
End: 2020-02-20
Attending: OBSTETRICS & GYNECOLOGY
Payer: COMMERCIAL

## 2020-02-20 DIAGNOSIS — O35.10X0 CHROMOSOMAL ABNORMALITY IN FETUS, AFFECTING MANAGEMENT OF MOTHER, ANTEPARTUM, SINGLE OR UNSPECIFIED FETUS: Primary | ICD-10-CM

## 2020-02-20 PROCEDURE — 76816 OB US FOLLOW-UP PER FETUS: CPT

## 2020-02-20 PROCEDURE — 76825 ECHO EXAM OF FETAL HEART: CPT

## 2020-02-20 NOTE — PROGRESS NOTES
Fetal Cardiology Consultation    Patient:  Brooke Vidal MRN:  [id number]   YOB: 1990 Age:  30 year old   Date of Visit:  2020 PCP:  Mychal Joiner MD   ÓSCAR: 2020, by Last Menstrual Period EGA: 22w5d weeks     Dear Dr. Draper:    I had the pleasure of seeing Brooke Vidal at the [facility] in [place] on 2020 in consultation for fetal echocardiography results. She presented today accompanied by her partner. 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 Prograde aortic arch flow. Thickened aortic valve leaflets with grossly normal motion and normal prograde flow, no insufficiency. Otherwise normal cardiac anatomy. Normal right and left ventricular size and systolic function. Small pericardial effusion.     I reviewed and interpreted the fetal echocardiogram today. Using pictures I discussed the anatomy, physiology, expected fetal course, and need for post- confirmation. The fetal cardiac anatomy may be dependent on the ductus arteriosus after birth for systemic blood flow. The expected post- intervention will depend on confirmation of these findings with post- imaging.    -- A follow-up fetal echocardiogram is recommended in 4-6 weeks.  -- A post-alem transthoracic echocardiogram is recommended immediately following delivery with pediatric cardiology consultation.  -- Delivery should occur at [facility].    Thank you for allowing me to participate in Ms. Vidal's care. Please don't hesitate to contact me or the Fetal Cardiology team at [facility] with any questions or concerns.     I spent a total of 45  minutes face-to-face with MsValerie Young during today's office visit. Over 50% of this time was spent counseling the patient and/or coordinating care regarding the fetal echocardiography results.     Mychal Holt MD  Pediatric Cardiology  [facility]  Phone [phone]

## 2020-02-21 ENCOUNTER — TELEPHONE (OUTPATIENT)
Dept: MATERNAL FETAL MEDICINE | Facility: CLINIC | Age: 30
End: 2020-02-21

## 2020-02-21 DIAGNOSIS — O35.10X0 CHROMOSOMAL ABNORMALITY IN FETUS, AFFECTING MANAGEMENT OF MOTHER, ANTEPARTUM, SINGLE OR UNSPECIFIED FETUS: Primary | ICD-10-CM

## 2020-02-21 NOTE — TELEPHONE ENCOUNTER
LM for Daniel to call back PCC #. Will schedule INES visit and discuss 28 week US, ECHO, NICU/SW OB visit.  Chiquita Alvarado RN

## 2020-02-21 NOTE — TELEPHONE ENCOUNTER
Daniel called back and discussed transferring her OB care to [facility], explained those visits will take place at [facility]. Pt does not agree with that plan and would prefer to continue care with her primary OB until 28 wks. Plan will be to do BP checks at [facility] with her weekly US. RL2, Fetal echo, INES OB visit and NICU/SW scheduled on 4/1. Pt agrees with appt date and times. Writer instructed pt she can change her mind at any time and we will transfer her OB care sooner if anything changes. PT MICHELA, has PCC # to call with any questions or concerns.  Chiquita Alvarado RN

## 2020-02-28 ENCOUNTER — HOSPITAL ENCOUNTER (OUTPATIENT)
Dept: ULTRASOUND IMAGING | Facility: CLINIC | Age: 30
End: 2020-02-28
Attending: OBSTETRICS & GYNECOLOGY

## 2020-02-28 ENCOUNTER — OFFICE VISIT (OUTPATIENT)
Dept: MATERNAL FETAL MEDICINE | Facility: CLINIC | Age: 30
End: 2020-02-28
Attending: OBSTETRICS & GYNECOLOGY

## 2020-02-28 VITALS — DIASTOLIC BLOOD PRESSURE: 73 MMHG | SYSTOLIC BLOOD PRESSURE: 105 MMHG | HEART RATE: 82 BPM

## 2020-02-28 DIAGNOSIS — O35.10X0 CHROMOSOMAL ABNORMALITY IN FETUS, AFFECTING MANAGEMENT OF MOTHER, ANTEPARTUM, SINGLE OR UNSPECIFIED FETUS: Primary | ICD-10-CM

## 2020-03-03 ENCOUNTER — OFFICE VISIT (OUTPATIENT)
Dept: MATERNAL FETAL MEDICINE | Facility: CLINIC | Age: 30
End: 2020-03-03
Attending: OBSTETRICS & GYNECOLOGY
Payer: COMMERCIAL

## 2020-03-03 ENCOUNTER — HOSPITAL ENCOUNTER (OUTPATIENT)
Dept: ULTRASOUND IMAGING | Facility: CLINIC | Age: 30
Discharge: HOME | End: 2020-03-03
Attending: OBSTETRICS & GYNECOLOGY | Admitting: OBSTETRICS & GYNECOLOGY
Payer: COMMERCIAL

## 2020-03-03 DIAGNOSIS — O35.10X0 CHROMOSOMAL ABNORMALITY IN FETUS, AFFECTING MANAGEMENT OF MOTHER, ANTEPARTUM, SINGLE OR UNSPECIFIED FETUS: Primary | ICD-10-CM

## 2020-03-03 PROCEDURE — 76816 OB US FOLLOW-UP PER FETUS: CPT

## 2020-03-03 NOTE — PROGRESS NOTES
"Please see \"Imaging\" tab under Chart Review for full details.    Minerva Rico MD  Maternal Fetal Medicine    "

## 2020-03-10 ENCOUNTER — HOSPITAL ENCOUNTER (OUTPATIENT)
Dept: ULTRASOUND IMAGING | Facility: CLINIC | Age: 30
End: 2020-03-10
Attending: OBSTETRICS & GYNECOLOGY
Payer: COMMERCIAL

## 2020-03-10 ENCOUNTER — OFFICE VISIT (OUTPATIENT)
Dept: MATERNAL FETAL MEDICINE | Facility: CLINIC | Age: 30
End: 2020-03-10
Attending: OBSTETRICS & GYNECOLOGY
Payer: COMMERCIAL

## 2020-03-10 VITALS — SYSTOLIC BLOOD PRESSURE: 110 MMHG | DIASTOLIC BLOOD PRESSURE: 76 MMHG | HEART RATE: 86 BPM

## 2020-03-10 DIAGNOSIS — O35.10X1 MATERNAL CARE FOR SUSPECTED CHROMOSOMAL ABNORMALITY IN FETUS, FETUS 1: ICD-10-CM

## 2020-03-10 PROCEDURE — 76816 OB US FOLLOW-UP PER FETUS: CPT

## 2020-03-10 NOTE — PROGRESS NOTES
"Please see \"Imaging\" tab under \"Chart Review\" for details of today's US at the [facility].    Charlie Knapp MD  Maternal-Fetal Medicine    "

## 2020-03-11 ENCOUNTER — HEALTH MAINTENANCE LETTER (OUTPATIENT)
Age: 30
End: 2020-03-11

## 2020-03-18 ENCOUNTER — HOSPITAL ENCOUNTER (OUTPATIENT)
Dept: ULTRASOUND IMAGING | Facility: CLINIC | Age: 30
End: 2020-03-18
Attending: OBSTETRICS & GYNECOLOGY
Payer: COMMERCIAL

## 2020-03-18 ENCOUNTER — OFFICE VISIT (OUTPATIENT)
Dept: MATERNAL FETAL MEDICINE | Facility: CLINIC | Age: 30
End: 2020-03-18
Attending: OBSTETRICS & GYNECOLOGY
Payer: COMMERCIAL

## 2020-03-18 DIAGNOSIS — O35.8XX0 CYSTIC HYGROMA OF FETUS IN SINGLETON PREGNANCY: ICD-10-CM

## 2020-03-18 PROCEDURE — 76816 OB US FOLLOW-UP PER FETUS: CPT

## 2020-03-18 NOTE — PROGRESS NOTES
"Please see \"Imaging\" tab under \"Chart Review\" for details of today's US at the [facility].    Charlie Knapp MD  Maternal-Fetal Medicine    " Hospital Course:   Mr. Skelton was admitted with abdominal pain with CTA that was negative for PE, but showed gallbladder that was mildly distended noting there may be cholelithiasis or sludge near the gallbladder neck which was concerning for cholecystitis as source of pain. He was worked up with U/S which was remarkable for cholelithiasis with minimal gallbladder wall thickening but then HIDA scan that was consistent with cholecysititis. GI and Surgery are following. Pt the spiked a fever and he was started on Zosyn. Blood cultures returned with GNR. He had his coumadin reversed and was worked up with MRCP which was negative. Plan to take patient to OR for cholecystectomy on 6/2.  Patient's blood cultures from 6/2 were positive as well and repeat blood cultures were sent on 6/3 that were positive as well.  ID was consulted.  Renal was consulted on 6/4 for worsening renal function. The patient's renal failure was likely ATN from dye. It improved over the course of several days. Blood cultures remained negative.     He was d/c on Augmentin for 12 days. He completed prior to this INR. He reports prostate bx now moved to 7/16. We will need INR one week prior preferably 7/9 or 7/10 to make sure we are holding properly.

## 2020-03-23 ENCOUNTER — TELEPHONE (OUTPATIENT)
Dept: MATERNAL FETAL MEDICINE | Facility: CLINIC | Age: 30
End: 2020-03-23

## 2020-03-23 NOTE — TELEPHONE ENCOUNTER
Pt calls wondering whether NICU tour on 4/1 will be cancelled. Pt advised to present to [facility] for US/fetal echo appt without spouse/child, will include spouse by phone or facetime for as much of appt as desired. No NICU tours at this time, but consult will be completed by phone. Pt VU.

## 2020-03-24 ENCOUNTER — HOSPITAL ENCOUNTER (OUTPATIENT)
Dept: ULTRASOUND IMAGING | Facility: CLINIC | Age: 30
End: 2020-03-24
Attending: OBSTETRICS & GYNECOLOGY
Payer: COMMERCIAL

## 2020-03-24 ENCOUNTER — OFFICE VISIT (OUTPATIENT)
Dept: MATERNAL FETAL MEDICINE | Facility: CLINIC | Age: 30
End: 2020-03-24
Attending: OBSTETRICS & GYNECOLOGY
Payer: COMMERCIAL

## 2020-03-24 VITALS — HEART RATE: 94 BPM | DIASTOLIC BLOOD PRESSURE: 72 MMHG | SYSTOLIC BLOOD PRESSURE: 104 MMHG

## 2020-03-24 DIAGNOSIS — O35.10X0 CHROMOSOMAL ABNORMALITY IN FETUS, AFFECTING MANAGEMENT OF MOTHER, ANTEPARTUM, SINGLE OR UNSPECIFIED FETUS: Primary | ICD-10-CM

## 2020-03-24 PROCEDURE — 76816 OB US FOLLOW-UP PER FETUS: CPT

## 2020-04-01 ENCOUNTER — HOSPITAL ENCOUNTER (OUTPATIENT)
Dept: CARDIOLOGY | Facility: CLINIC | Age: 30
End: 2020-04-01
Attending: OBSTETRICS & GYNECOLOGY
Payer: COMMERCIAL

## 2020-04-01 ENCOUNTER — VIRTUAL VISIT (OUTPATIENT)
Dept: MATERNAL FETAL MEDICINE | Facility: CLINIC | Age: 30
End: 2020-04-01
Attending: OBSTETRICS & GYNECOLOGY
Payer: COMMERCIAL

## 2020-04-01 ENCOUNTER — HOSPITAL ENCOUNTER (OUTPATIENT)
Dept: ULTRASOUND IMAGING | Facility: CLINIC | Age: 30
End: 2020-04-01
Attending: OBSTETRICS & GYNECOLOGY
Payer: COMMERCIAL

## 2020-04-01 VITALS
SYSTOLIC BLOOD PRESSURE: 123 MMHG | OXYGEN SATURATION: 99 % | WEIGHT: 188.2 LBS | HEART RATE: 84 BPM | DIASTOLIC BLOOD PRESSURE: 85 MMHG | RESPIRATION RATE: 18 BRPM

## 2020-04-01 DIAGNOSIS — O35.10X0 CHROMOSOMAL ABNORMALITY IN FETUS, AFFECTING MANAGEMENT OF MOTHER, ANTEPARTUM, SINGLE OR UNSPECIFIED FETUS: Primary | ICD-10-CM

## 2020-04-01 DIAGNOSIS — O35.10X0 CHROMOSOMAL ABNORMALITY IN FETUS, AFFECTING MANAGEMENT OF MOTHER, ANTEPARTUM, SINGLE OR UNSPECIFIED FETUS: ICD-10-CM

## 2020-04-01 DIAGNOSIS — O35.BXX0 ANOMALY OF HEART OF FETUS AFFECTING PREGNANCY, ANTEPARTUM, SINGLE OR UNSPECIFIED FETUS: ICD-10-CM

## 2020-04-01 PROCEDURE — G0463 HOSPITAL OUTPT CLINIC VISIT: HCPCS | Mod: 25,ZF

## 2020-04-01 PROCEDURE — 76816 OB US FOLLOW-UP PER FETUS: CPT

## 2020-04-01 PROCEDURE — 93325 DOPPLER ECHO COLOR FLOW MAPG: CPT

## 2020-04-01 ASSESSMENT — PAIN SCALES - GENERAL: PAINLEVEL: NO PAIN (0)

## 2020-04-01 NOTE — LETTER
April 1, 2020      Dr. Joiner,       Your patient, Brooke Vidal, 1990, was seen at [facility] on April 01, 2020 due to pregnancy complications of monosomy X, hydrops, and fetal CHD. A follow up ultrasound and fetal echo were completed today. A detailed copy of her ultrasound reports are being sent to you.  After reviewing Brooke Vidal's case, Dr. Delatorre recommends delivery at [facility].  Brooke desires to continue care with you at [facility]. She will continue to receive ultrasounds with [facility]. We will plan her delivery at [facility].      Feel free to contact us with any questions/concerns.      Sincerely,    CYNTHIA FORTE RN    Patient Care Coordinator  [facility] Center  Phone: [phone]  Fax: [phone]

## 2020-04-01 NOTE — NURSING NOTE
Daniel seen in clinic today for follow up ultrasound at 28w4d gestation due to pregnancy c/b NIPT + monosomy X, cystic hygroma, hydrops, and fetal coarctation of aorta (see report/notes). VSS. Pt reports + fetal movement Pt denies bldg/lof/change in discharge/contractions/headache/vision changes/chest pain/SOB/edema. GCT, TDap, Rhogam all completed with [facility]. Daniel desires to continue care with [facility], ultrasounds with [facility], and delivery at [facility]. Dr. Delatorre met with pt and discussed POC. Plan to follow up in 2 weeks then in 4 weeks at [facility].  Dr. Holt met with Daniel after fetal echo. NICU consult with Dr. Celis via phone completed. Pt's  Blaine joined the conversations via phone. Pt discharged stable and ambulatory.       Renetta Galdamez RN

## 2020-04-02 NOTE — PROGRESS NOTES
NEONATALOLOGY PHONE CONSULTATION   DATE: 2020      I had the opportunity to talk with Ms. Brooke Vidal and her , Blaine, at the request of Dr. Therese Delatorre of Maternal Fetal Medicine at the [facility]. Ms. Vidal is currently 28 weeks and 4 days pregnant with a fetus diagnosed with Monosomy X (on NIPT), hydrops, large cystic hygroma and coarctation of the aorta on fetal echo.     I reviewed the expected  course for an infant with Monosomy X, hydrops and coarctation of the aorta with Ms. Vidal. I described the  Resuscitation team that will be present at the delivery prior to admission to the  Intensive Care Unit. We discussed the varying levels of support that may be needed in the delivery room, including CPAP, intubation, and potentially the need for removal of pleural, pericardial or peritoneal fluid. I described the admission process for the  Intensive Care Unit including the probable placement of umbilical IV catheters for the administration of fluids and medications while confirmatory diagnostic efforts are underway. I described the ongoing collaboration with Pediatric Cardiology and that a  echo would be obtained immediately after birth. We discussed the importance of the post- echocardiogram findings for confirming the cardiac diagnosis and then making ongoing cardiac plans.     I described the basic makeup of the  Intensive Care Unit team and invited the family to be present for rounds daily. I described the additional resources present from lactation, occupational therapy, and maternal child health social work. At this time, Ms. Vidal is planning on breastfeeding. We look forward to caring for Daniel farmer infant in the  Intensive Care Unit at the [facility]. Please do not hesitate to contact me if I can be of further assistance prior to delivery.       YOBANY VALDEZ MD    Total time of  visit 30 minutes with 100% of the time in direct patient consultation.

## 2020-04-02 NOTE — PROGRESS NOTES
Fetal Cardiology Consultation    Patient:  Brooke Vidal MRN:  [id number]   YOB: 1990 Age:  30 year old   Date of Visit:  2020 PCP:  Mychal Joiner MD   ÓSCAR: 2020, by Last Menstrual Period EGA: 28w5d weeks     Dear Dr. Delatorre:    I had the pleasure of seeing Brooke Vidal at the [facility] in [place] on 2020 in ongoing consultation for fetal echocardiography results. She presented today by herself; results were discussed in tandem with her partner by phone. 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 Prograde aortic arch flow. Thickened aortic valve leaflets with grossly normal motion and normal prograde flow, no insufficiency. Otherwise normal cardiac anatomy. Normal right and left ventricular size and systolic function. Small pericardial effusion.      I reviewed and interpreted the fetal echocardiogram today. I discussed the anatomy, physiology, expected fetal course, and need for post- confirmation. The fetal cardiac anatomy may be expected to be dependent on the ductus arteriosus after birth for systemic blood flow. The expected post- intervention will depend on confirmation of these findings with post- imaging, and is likely to include surgical correction of the arch hypoplasia in the  period.     I discussed results of the study and visit with Dr. Griselli.  -- No additional fetal echocardiograms are recommended for this pregnancy.  -- A post- transthoracic echocardiogram is recommended immediately following delivery with pediatric cardiology consultation.  --  Recommend consultation with the Pediatric Cardiothoracic Surgery service at [facility].    Thank you for allowing me to participate in Ms. Vidal's care. Please don't hesitate to contact me or the Fetal Cardiology team at [facility] with any questions or concerns.     I spent a total of 20 minutes face-to-face with Ms. Vidal during today's office visit. Over 50% of this time was spent counseling the patient and/or coordinating care regarding the fetal echocardiography results.     Mychal Holt MD  Pediatric Cardiology  [facility]  Phone [phone]

## 2020-04-15 ENCOUNTER — OFFICE VISIT (OUTPATIENT)
Dept: MATERNAL FETAL MEDICINE | Facility: CLINIC | Age: 30
End: 2020-04-15
Attending: OBSTETRICS & GYNECOLOGY
Payer: COMMERCIAL

## 2020-04-15 ENCOUNTER — HOSPITAL ENCOUNTER (OUTPATIENT)
Dept: ULTRASOUND IMAGING | Facility: CLINIC | Age: 30
End: 2020-04-15
Attending: OBSTETRICS & GYNECOLOGY
Payer: COMMERCIAL

## 2020-04-15 VITALS — DIASTOLIC BLOOD PRESSURE: 77 MMHG | SYSTOLIC BLOOD PRESSURE: 115 MMHG | HEART RATE: 94 BPM

## 2020-04-15 DIAGNOSIS — O35.BXX0 ANOMALY OF HEART OF FETUS AFFECTING PREGNANCY, ANTEPARTUM, SINGLE OR UNSPECIFIED FETUS: ICD-10-CM

## 2020-04-15 DIAGNOSIS — O35.10X0 CHROMOSOMAL ABNORMALITY IN FETUS, AFFECTING MANAGEMENT OF MOTHER, ANTEPARTUM, SINGLE OR UNSPECIFIED FETUS: ICD-10-CM

## 2020-04-15 DIAGNOSIS — O35.10X0 CHROMOSOMAL ABNORMALITY IN FETUS, AFFECTING MANAGEMENT OF MOTHER, ANTEPARTUM, SINGLE OR UNSPECIFIED FETUS: Primary | ICD-10-CM

## 2020-04-15 DIAGNOSIS — O35.8XX0 CYSTIC HYGROMA OF FETUS IN SINGLETON PREGNANCY: ICD-10-CM

## 2020-04-15 PROCEDURE — 76816 OB US FOLLOW-UP PER FETUS: CPT

## 2020-04-20 ENCOUNTER — TELEPHONE (OUTPATIENT)
Dept: CARE COORDINATION | Facility: CLINIC | Age: 30
End: 2020-04-20

## 2020-04-21 ENCOUNTER — CARE COORDINATION (OUTPATIENT)
Dept: CARE COORDINATION | Facility: CLINIC | Age: 30
End: 2020-04-21

## 2020-04-21 ENCOUNTER — TELEPHONE (OUTPATIENT)
Dept: CARE COORDINATION | Facility: CLINIC | Age: 30
End: 2020-04-21

## 2020-04-21 NOTE — TELEPHONE ENCOUNTER
"[facility] CHILDREN'S \[facility]\""  MATERNAL CHILD HEALTH SOCIAL WORK INITIAL [facility] NICU CONSULT    DATA:     Presenting situation: Pt is a 31 y/o  female. SW consulted re: [facility] NICU consult.     Fetal diagnoses: monosomy X, hydrops (improved ), cystic hygroma, aortic coarctation  SW spoke with pt, Brooke \"Daniel\" Young ( 1990), this morning via telephone to assess needs and offer support.     Living situation: Family resides in [facility]).     Family constellation: Baby girl will be  couple's second child. FOB/spouse is Blaine; pt reports he is involved and supportive. Couple are also parents to a 3 y/o son, Diallo.     Social support: Pt reports family has good social support.     Transportation: They have reliable access to personal transportation a this time.    Employment: Pt (tel. [phone] / e-mail. [email or web address]) is a full-time parent to her son. During baby's hospitalization, family have coordinated for a  to be available as needed to support pt being at the hospital and to support FOB continuing to work. FOB is employed full-time at an [employer]. During hospitalization, FOB will take a few days PTO, but otherwise will continue working as able. Per pt, FOB works for a small employer and has flexibility with his schedule. Pt denied stressors related to FOBs employment at this time.    INTERVENTION:       DANA completed chart review and collaborated with the multidisciplinary team.    Completed initial psychosocial assessment with pt via telephone.    Introduction to Maternal Child Health  role and scope of practice.    Provided SW business card.    SW provided supportive counseling and appropriate resources related to the impact of upcoming hospitalization on pt and her family system.     Discussed pattern of coping, coping skills.     Reviewed Hospital and Community Resources    Reviewed " orientation to [facility]     Assessed Mental Health History and Current Symptoms    Reviewed protective factors such as healthy diet, regular exercise, sufficient sleep, and have a good support system.    Reassured family of ongoing SW supportive services available to them at the hospital. Encouraged parents to access this SW for support as needed.    ASSESSMENT:     Pt appears to be resilient and able to utilize strengths to cope. She appears to be coping adequately at this time. She was easily engaged and able to verbally express herself and identify needs. Family's support system appears to be engaged and helpful. Parents are aware of social work support and availability.     PLAN:     SW will continue to assess needs and provide ongoing psychosocial support and access to resources. SW will continue to collaborate with the multidisciplinary team.    MARCO Zurita, [facility]  Clinical   Maternal Child Health  [facility]  Phone:   [phone]  Pager:    [phone]

## 2020-04-21 NOTE — PROGRESS NOTES
[facility]  MATERNAL CHILD HEALTH SOCIAL WORK PROGRESS NOTE    With pt's verbal consent, SW e-mailed ('[email or web address]') her hospital and community resource information related to anticipated NICU admission:    Edis Sanchez,     It was amrit connecting with you this morning! Attached you will find a copy of the sibling and caregiver toolkits I referenced, as well as a few other resources other families have found supportive.    Online sibling resources: https://handtohold.org/nicu-family-support/sibling-support/recommended-books/    Books for siblings:    Jannette, Always by Denise Romero and Parveen Zepeda     Wherever You Are: My love will find you by Jodie Clements    The Way I Feel by Pee Chacon    My Many Colored Days by Dr. Land    The Invisible String     Maternal Resources:     Sidelines - https://www.sidelines.org/ - is a group that provides support to women experiencing high-risk pregnancy    High Risk Hope - https://www.highriskhope.org/    Postpartum Support [place] - https://www.[facility].org/    Please feel free to reach out anytime.    I look forward to working with you and your family,    MARCO Zurita, [facility]  Maternal Child Health   [address] [address]  E-mail:[email or web address]  Office: [phone]  Pager: [phone]  Fax: [phone]  Gender pronouns: she/her  Employed by [employer]

## 2020-04-29 ENCOUNTER — OFFICE VISIT (OUTPATIENT)
Dept: MATERNAL FETAL MEDICINE | Facility: CLINIC | Age: 30
End: 2020-04-29
Attending: OBSTETRICS & GYNECOLOGY
Payer: COMMERCIAL

## 2020-04-29 ENCOUNTER — HOSPITAL ENCOUNTER (OUTPATIENT)
Dept: ULTRASOUND IMAGING | Facility: CLINIC | Age: 30
End: 2020-04-29
Attending: OBSTETRICS & GYNECOLOGY
Payer: COMMERCIAL

## 2020-04-29 DIAGNOSIS — O35.10X0 CHROMOSOMAL ABNORMALITY IN FETUS, AFFECTING MANAGEMENT OF MOTHER, ANTEPARTUM, SINGLE OR UNSPECIFIED FETUS: Primary | ICD-10-CM

## 2020-04-29 DIAGNOSIS — O35.BXX0 ANOMALY OF HEART OF FETUS AFFECTING PREGNANCY, ANTEPARTUM, SINGLE OR UNSPECIFIED FETUS: ICD-10-CM

## 2020-04-29 DIAGNOSIS — O35.10X0 CHROMOSOMAL ABNORMALITY IN FETUS, AFFECTING MANAGEMENT OF MOTHER, ANTEPARTUM, SINGLE OR UNSPECIFIED FETUS: ICD-10-CM

## 2020-04-29 PROCEDURE — 76818 FETAL BIOPHYS PROFILE W/NST: CPT | Performed by: OBSTETRICS & GYNECOLOGY

## 2020-04-29 PROCEDURE — 59025 FETAL NON-STRESS TEST: CPT | Mod: ZF | Performed by: OBSTETRICS & GYNECOLOGY

## 2020-04-29 PROCEDURE — 76816 OB US FOLLOW-UP PER FETUS: CPT

## 2020-04-29 NOTE — NURSING NOTE
Daniel seen in clinic today for follow up growth US and BPP at 32w4d gestation due to NIPT monosomy X, hydrops resolving, coarctation of aorta (see report/notes). Pt reports + fetal movement, but states it feels less or not as strong. Reviewed fetal kick counts. Continues to see Dr. Joiner and did have an NST in the office yesterday. Pt denies bldg/lof/change in discharge/contractions/headache/vision changes/chest pain/SOB/edema. Dr. Barraza met with pt and discussed POC. Plan to continue weekly hydrops assessments with BPP. Plan for delivery at [facility]. Pt discharged stable and ambulatory.     NST Performed due to BPP 6/8.  Dr. Delatorre reviewed efm tracing. See NST/BPP Doc Flowsheet tab.     Renetta Galdamez RN

## 2020-05-01 ENCOUNTER — VIRTUAL VISIT (OUTPATIENT)
Dept: MATERNAL FETAL MEDICINE | Facility: CLINIC | Age: 30
End: 2020-05-01
Attending: OBSTETRICS & GYNECOLOGY
Payer: COMMERCIAL

## 2020-05-01 DIAGNOSIS — O35.10X0 CHROMOSOMAL ABNORMALITY IN FETUS, AFFECTING MANAGEMENT OF MOTHER, ANTEPARTUM, SINGLE OR UNSPECIFIED FETUS: ICD-10-CM

## 2020-05-01 PROCEDURE — 40000072 ZZH STATISTIC GENETIC COUNSELING, < 16 MIN: Mod: TEL,ZF | Performed by: GENETIC COUNSELOR, MS

## 2020-05-01 PROCEDURE — 96040 ZZH GENETIC COUNSELING, EACH 30 MINUTES: CPT | Mod: ZF,95 | Performed by: GENETIC COUNSELOR, MS

## 2020-05-01 NOTE — PROGRESS NOTES
[facility] Fetal [facility]  Genetic Counseling Consult    Patient: Brooke Vidal YOB: 1990   Date of Service: 20        Brooke was evaluated via a billable telephone visit at 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        The patient has been notified of the following:  This telephone visit will be conducted via a call between you and your physician/provider. We have found that certain health care needs can be provided without the need for a physical exam. This service lets us provide the care you need with a short phone conversation. If a prescription is necessary we can send it directly to your pharmacy. If lab work is needed we can place an order for that and you can then stop by our lab to have the test done at a later time.     If during the course of the call the provider feels a telephone visit is not appropriate, you will not be charged for this service.       Impression/Plan:   1.  Brooke  consented for genetic testing to be performed on her child after delivery.  The consent was scanned into her medical record and details for testing were added to Brooke 's delivery plans.    2.  Testing plans includes Chromosome Analysis Blood, Isabella Study. (Epic Lab code [id number]).  The patient will be contacted to discuss results as they become available.     3.  Brooke has already met with genetic counseling previously during the pregnancy to discuss her NIPT results and ultrasound findings.  Please see corresponding documentation for complete details..    4.  A renal ultrasound is recommended for individuals with a diagnosis of Wilburn syndrome.     Discussion:     The majority of time today was spent providing emotional support and discussing how she is feeling as she approaches delivery.  Brooke expressed  significant relief that the fetal hydrops has not progressed significantly, and expressed a mixture of optimism and anxiety moving forward.  After a brief review of Brooke 's prenatal history and available testing options Brooke was interested in pursuing genetic testing on cord blood at the time of delivery to clarify if her daughter has Wilburn syndrome.  We reviewed the option of chromosome analysis (karyotyping). Specimen requirements are 3 mL of peripheral blood in a green top (sodium heparin) tube.  The consent for genetic testing was signed at today's visit and is scanned in the Brooke 's chart.          It was a pleasure to be involved with Brooke 's care.   Call duration 30 Minutes  Call Start 10:15  Call end 10:45.       Francisco Mullins MS, [facility]  Licensed Genetic Counselor  Phone: [phone]  Pager: [phone]

## 2020-05-05 ENCOUNTER — OFFICE VISIT (OUTPATIENT)
Dept: MATERNAL FETAL MEDICINE | Facility: CLINIC | Age: 30
End: 2020-05-05
Attending: OBSTETRICS & GYNECOLOGY
Payer: COMMERCIAL

## 2020-05-05 ENCOUNTER — HOSPITAL ENCOUNTER (OUTPATIENT)
Dept: ULTRASOUND IMAGING | Facility: CLINIC | Age: 30
End: 2020-05-05
Attending: OBSTETRICS & GYNECOLOGY
Payer: COMMERCIAL

## 2020-05-05 VITALS — DIASTOLIC BLOOD PRESSURE: 74 MMHG | SYSTOLIC BLOOD PRESSURE: 111 MMHG

## 2020-05-05 DIAGNOSIS — O35.10X0 CHROMOSOMAL ABNORMALITY IN FETUS, AFFECTING MANAGEMENT OF MOTHER, ANTEPARTUM, SINGLE OR UNSPECIFIED FETUS: ICD-10-CM

## 2020-05-05 DIAGNOSIS — O35.10X0 CHROMOSOMAL ABNORMALITY IN FETUS, AFFECTING MANAGEMENT OF MOTHER, ANTEPARTUM, SINGLE OR UNSPECIFIED FETUS: Primary | ICD-10-CM

## 2020-05-05 PROCEDURE — 76819 FETAL BIOPHYS PROFIL W/O NST: CPT | Performed by: OBSTETRICS & GYNECOLOGY

## 2020-05-05 PROCEDURE — 76816 OB US FOLLOW-UP PER FETUS: CPT

## 2020-05-08 ENCOUNTER — TELEPHONE (OUTPATIENT)
Dept: MATERNAL FETAL MEDICINE | Facility: CLINIC | Age: 30
End: 2020-05-08

## 2020-05-08 NOTE — TELEPHONE ENCOUNTER
LM to let pt know she is scheduled for CT surgery consult with Dr. Griselli on 6/3 @1 pm. PCC # given to call with any questions.  Chiquita Alvarado RN

## 2020-05-12 ENCOUNTER — HOSPITAL ENCOUNTER (OUTPATIENT)
Dept: ULTRASOUND IMAGING | Facility: CLINIC | Age: 30
End: 2020-05-12
Attending: OBSTETRICS & GYNECOLOGY
Payer: COMMERCIAL

## 2020-05-12 ENCOUNTER — OFFICE VISIT (OUTPATIENT)
Dept: MATERNAL FETAL MEDICINE | Facility: CLINIC | Age: 30
End: 2020-05-12
Attending: OBSTETRICS & GYNECOLOGY
Payer: COMMERCIAL

## 2020-05-12 VITALS — SYSTOLIC BLOOD PRESSURE: 112 MMHG | DIASTOLIC BLOOD PRESSURE: 74 MMHG

## 2020-05-12 DIAGNOSIS — O35.10X0 CHROMOSOMAL ABNORMALITY IN FETUS, AFFECTING MANAGEMENT OF MOTHER, ANTEPARTUM, SINGLE OR UNSPECIFIED FETUS: ICD-10-CM

## 2020-05-12 DIAGNOSIS — O35.8XX0 CYSTIC HYGROMA OF FETUS IN SINGLETON PREGNANCY: ICD-10-CM

## 2020-05-12 DIAGNOSIS — O35.10X0 CHROMOSOMAL ABNORMALITY IN FETUS, AFFECTING MANAGEMENT OF MOTHER, ANTEPARTUM, SINGLE OR UNSPECIFIED FETUS: Primary | ICD-10-CM

## 2020-05-12 PROCEDURE — 76816 OB US FOLLOW-UP PER FETUS: CPT

## 2020-05-12 PROCEDURE — 76819 FETAL BIOPHYS PROFIL W/O NST: CPT | Performed by: OBSTETRICS & GYNECOLOGY

## 2020-05-13 ENCOUNTER — TELEPHONE (OUTPATIENT)
Dept: MATERNAL FETAL MEDICINE | Facility: CLINIC | Age: 30
End: 2020-05-13

## 2020-05-13 NOTE — TELEPHONE ENCOUNTER
Phone call to Daniel following up after MFM visit on 5/12. PCC RN will f/u again on Friday 5/15 after visit to update plan. Discussed possibly scheduling c/section 39-40 weeks due to breech presentation. Daniel would like to discuss external version again. Again, RN will f/u on 5/15 with provider.    Renetta Galdamez RN

## 2020-05-15 ENCOUNTER — HOSPITAL ENCOUNTER (OUTPATIENT)
Dept: ULTRASOUND IMAGING | Facility: CLINIC | Age: 30
End: 2020-05-15
Attending: OBSTETRICS & GYNECOLOGY
Payer: COMMERCIAL

## 2020-05-15 ENCOUNTER — OFFICE VISIT (OUTPATIENT)
Dept: MATERNAL FETAL MEDICINE | Facility: CLINIC | Age: 30
End: 2020-05-15
Attending: OBSTETRICS & GYNECOLOGY
Payer: COMMERCIAL

## 2020-05-15 DIAGNOSIS — O35.8XX0 CYSTIC HYGROMA OF FETUS IN SINGLETON PREGNANCY: ICD-10-CM

## 2020-05-15 DIAGNOSIS — O35.10X0 CHROMOSOMAL ABNORMALITY IN FETUS, AFFECTING MANAGEMENT OF MOTHER, ANTEPARTUM, SINGLE OR UNSPECIFIED FETUS: Primary | ICD-10-CM

## 2020-05-15 DIAGNOSIS — O35.10X0 CHROMOSOMAL ABNORMALITY IN FETUS, AFFECTING MANAGEMENT OF MOTHER, ANTEPARTUM, SINGLE OR UNSPECIFIED FETUS: ICD-10-CM

## 2020-05-15 PROCEDURE — 76821 MIDDLE CEREBRAL ARTERY ECHO: CPT | Performed by: OBSTETRICS & GYNECOLOGY

## 2020-05-15 PROCEDURE — 76816 OB US FOLLOW-UP PER FETUS: CPT

## 2020-05-15 NOTE — PROGRESS NOTES
Please see full imaging report from [facility] program under imaging tab.    Findings reviewed with Abhay Delatorre and Ra, who are also familiar with this case. I reviewed the findings as well with [facility] in person at the time of her visit. I recommend she continue with fetal kick counts over the weekend, and contact the [facility] on call (Dr. Knapp) if she has any concerns or questions. She will follow up via phone with Renetta [facility], on Monday and we will tentatively plan to continue with twice weekly BPPs with a goal of a term delivery given the known fetal cardiac anomaly.     Daniel is aware that the baby is breech and there is high likelihood of  delivery. I discussed possible concerns with offering external cephalic version in the context of the fetal body edema.     She is scheduled as of now to continue with twice weekly BPPs.     Willam Mariano MD  Maternal Fetal Medicine

## 2020-05-18 ENCOUNTER — TELEPHONE (OUTPATIENT)
Dept: MATERNAL FETAL MEDICINE | Facility: CLINIC | Age: 30
End: 2020-05-18

## 2020-05-18 NOTE — TELEPHONE ENCOUNTER
Phone call to Daniel checking in on status. Daniel states she is feeling good fetal movement and doing daily kick counts. Feeling a little nervous appointment to appointment not knowing if delivery will be sooner or later. Discussed c/section plan, touched on anesthesia, pain management, NICU, visitor restrictions. Writer will have  follow up with Daniel.      Renetta Galdamez RN

## 2020-05-19 ENCOUNTER — HOSPITAL ENCOUNTER (OUTPATIENT)
Dept: ULTRASOUND IMAGING | Facility: CLINIC | Age: 30
End: 2020-05-19
Attending: OBSTETRICS & GYNECOLOGY
Payer: COMMERCIAL

## 2020-05-19 ENCOUNTER — OFFICE VISIT (OUTPATIENT)
Dept: MATERNAL FETAL MEDICINE | Facility: CLINIC | Age: 30
End: 2020-05-19
Attending: OBSTETRICS & GYNECOLOGY
Payer: COMMERCIAL

## 2020-05-19 VITALS — HEART RATE: 73 BPM | DIASTOLIC BLOOD PRESSURE: 73 MMHG | SYSTOLIC BLOOD PRESSURE: 107 MMHG

## 2020-05-19 DIAGNOSIS — O35.10X0 CHROMOSOMAL ABNORMALITY IN FETUS, AFFECTING MANAGEMENT OF MOTHER, ANTEPARTUM, SINGLE OR UNSPECIFIED FETUS: Primary | ICD-10-CM

## 2020-05-19 DIAGNOSIS — O35.8XX0 CYSTIC HYGROMA OF FETUS IN SINGLETON PREGNANCY: ICD-10-CM

## 2020-05-19 DIAGNOSIS — O35.10X0 CHROMOSOMAL ABNORMALITY IN FETUS, AFFECTING MANAGEMENT OF MOTHER, ANTEPARTUM, SINGLE OR UNSPECIFIED FETUS: ICD-10-CM

## 2020-05-19 PROCEDURE — 76816 OB US FOLLOW-UP PER FETUS: CPT

## 2020-05-19 PROCEDURE — 76819 FETAL BIOPHYS PROFIL W/O NST: CPT | Performed by: OBSTETRICS & GYNECOLOGY

## 2020-05-19 NOTE — PROGRESS NOTES
Please refer to ultrasound report under 'Imaging' Studies of 'Chart Review' tabs.    Yunior Rooney M.D.

## 2020-05-20 ENCOUNTER — TELEPHONE (OUTPATIENT)
Dept: CARE COORDINATION | Facility: CLINIC | Age: 30
End: 2020-05-20

## 2020-05-20 NOTE — TELEPHONE ENCOUNTER
[facility]  MATERNAL CHILD HEALTH - SOCIAL WORK PROGRESS NOTE    [facility] contacted DANA as pt indicated having some logistical questions as she prepares for anticipated NICU admission following delivery.    SW spoke with Daniel briefly on 5.19.2020 and she was busy and unable to talk at that time.    SW called Daniel again on 5.20.2020 and spent time addressing her questions related to planning for anticipated NICU admission.  SW provided information on current visitor restrictions d/t COVID, lodging options, and hospital resources.  Daniel admits having a hard time imagining leaving her baby overnight if she is to be in a nursery space that she will not be able to sleep bedside.  She is understanding of unknown variables about where her baby will be physically within [facility] when she is ready for discharge, and trying to plan for various scenarios.  SW offered supportive counseling, active and reflective listening, and validation of expressed feelings.      Daniel appears to be utilizing her coping techniques and appropriately planning for admission, and is also stressed about this upcoming experience.  She was initially tearful, but reported feeling better after talking through her questions; she was encouraged to reach out as needed for ongoing support prior to admission.    DANA will continue to follow.    Meredith Becker [facility]  Clinical   Maternal Child Health  Phone: [phone]  Pager: [phone]

## 2020-05-22 ENCOUNTER — HOSPITAL ENCOUNTER (OUTPATIENT)
Dept: ULTRASOUND IMAGING | Facility: CLINIC | Age: 30
End: 2020-05-22
Attending: OBSTETRICS & GYNECOLOGY
Payer: COMMERCIAL

## 2020-05-22 ENCOUNTER — OFFICE VISIT (OUTPATIENT)
Dept: MATERNAL FETAL MEDICINE | Facility: CLINIC | Age: 30
End: 2020-05-22
Attending: OBSTETRICS & GYNECOLOGY
Payer: COMMERCIAL

## 2020-05-22 DIAGNOSIS — O35.8XX0 CYSTIC HYGROMA OF FETUS IN SINGLETON PREGNANCY: ICD-10-CM

## 2020-05-22 DIAGNOSIS — O35.10X0 CHROMOSOMAL ABNORMALITY IN FETUS, AFFECTING MANAGEMENT OF MOTHER, ANTEPARTUM, SINGLE OR UNSPECIFIED FETUS: Primary | ICD-10-CM

## 2020-05-22 DIAGNOSIS — O35.10X0 CHROMOSOMAL ABNORMALITY IN FETUS, AFFECTING MANAGEMENT OF MOTHER, ANTEPARTUM, SINGLE OR UNSPECIFIED FETUS: ICD-10-CM

## 2020-05-22 PROCEDURE — 76819 FETAL BIOPHYS PROFIL W/O NST: CPT

## 2020-05-26 ENCOUNTER — TELEPHONE (OUTPATIENT)
Dept: MATERNAL FETAL MEDICINE | Facility: CLINIC | Age: 30
End: 2020-05-26

## 2020-05-26 ENCOUNTER — OFFICE VISIT (OUTPATIENT)
Dept: MATERNAL FETAL MEDICINE | Facility: CLINIC | Age: 30
End: 2020-05-26
Attending: OBSTETRICS & GYNECOLOGY
Payer: COMMERCIAL

## 2020-05-26 ENCOUNTER — HOSPITAL ENCOUNTER (OUTPATIENT)
Dept: ULTRASOUND IMAGING | Facility: CLINIC | Age: 30
End: 2020-05-26
Attending: OBSTETRICS & GYNECOLOGY
Payer: COMMERCIAL

## 2020-05-26 VITALS — SYSTOLIC BLOOD PRESSURE: 113 MMHG | DIASTOLIC BLOOD PRESSURE: 75 MMHG

## 2020-05-26 DIAGNOSIS — O35.10X0 CHROMOSOMAL ABNORMALITY IN FETUS, AFFECTING MANAGEMENT OF MOTHER, ANTEPARTUM, SINGLE OR UNSPECIFIED FETUS: Primary | ICD-10-CM

## 2020-05-26 DIAGNOSIS — O35.10X0 CHROMOSOMAL ABNORMALITY IN FETUS, AFFECTING MANAGEMENT OF MOTHER, ANTEPARTUM, SINGLE OR UNSPECIFIED FETUS: ICD-10-CM

## 2020-05-26 DIAGNOSIS — O35.BXX0 ANOMALY OF HEART OF FETUS AFFECTING PREGNANCY, ANTEPARTUM, SINGLE OR UNSPECIFIED FETUS: ICD-10-CM

## 2020-05-26 LAB — GROUP B STREP PCR: NEGATIVE

## 2020-05-26 PROCEDURE — 76819 FETAL BIOPHYS PROFIL W/O NST: CPT | Performed by: OBSTETRICS & GYNECOLOGY

## 2020-05-26 PROCEDURE — 76816 OB US FOLLOW-UP PER FETUS: CPT

## 2020-05-26 NOTE — TELEPHONE ENCOUNTER
Phone call to Daniel to confirm IOL date of 6/15 at 39w2d, 0800. Pt agreed. L&D and NICU notified. Will review handout next week at visit. Briefly discussed covid testing 48-72 hrs prior to IOL.      Renetta Galdamez RN

## 2020-05-26 NOTE — PROGRESS NOTES
"Please see \"Imaging\" tab under \"Chart Review\" for details of today's US at the [facility].    Charlie Knapp MD  Maternal-Fetal Medicine    "

## 2020-05-29 ENCOUNTER — OFFICE VISIT (OUTPATIENT)
Dept: MATERNAL FETAL MEDICINE | Facility: CLINIC | Age: 30
End: 2020-05-29
Attending: OBSTETRICS & GYNECOLOGY
Payer: COMMERCIAL

## 2020-05-29 ENCOUNTER — HOSPITAL ENCOUNTER (OUTPATIENT)
Dept: ULTRASOUND IMAGING | Facility: CLINIC | Age: 30
End: 2020-05-29
Attending: OBSTETRICS & GYNECOLOGY
Payer: COMMERCIAL

## 2020-05-29 DIAGNOSIS — O35.8XX0 CYSTIC HYGROMA OF FETUS IN SINGLETON PREGNANCY: ICD-10-CM

## 2020-05-29 DIAGNOSIS — O35.10X0 CHROMOSOMAL ABNORMALITY IN FETUS, AFFECTING MANAGEMENT OF MOTHER, ANTEPARTUM, SINGLE OR UNSPECIFIED FETUS: Primary | ICD-10-CM

## 2020-05-29 DIAGNOSIS — O35.10X0 CHROMOSOMAL ABNORMALITY IN FETUS, AFFECTING MANAGEMENT OF MOTHER, ANTEPARTUM, SINGLE OR UNSPECIFIED FETUS: ICD-10-CM

## 2020-05-29 PROCEDURE — 76819 FETAL BIOPHYS PROFIL W/O NST: CPT

## 2020-05-29 NOTE — PROGRESS NOTES
Please see full imaging report from ViewPoint program under imaging tab.    BPP 8/8    Willam Mariano MD  Maternal Fetal Medicine

## 2020-06-02 ENCOUNTER — TRANSFERRED RECORDS (OUTPATIENT)
Dept: HEALTH INFORMATION MANAGEMENT | Facility: CLINIC | Age: 30
End: 2020-06-02

## 2020-06-03 ENCOUNTER — OFFICE VISIT (OUTPATIENT)
Dept: MATERNAL FETAL MEDICINE | Facility: CLINIC | Age: 30
End: 2020-06-03
Attending: OBSTETRICS & GYNECOLOGY
Payer: COMMERCIAL

## 2020-06-03 ENCOUNTER — OFFICE VISIT (OUTPATIENT)
Dept: PEDIATRIC CARDIOLOGY | Facility: CLINIC | Age: 30
End: 2020-06-03
Attending: PEDIATRICS
Payer: COMMERCIAL

## 2020-06-03 ENCOUNTER — HOSPITAL ENCOUNTER (OUTPATIENT)
Dept: ULTRASOUND IMAGING | Facility: CLINIC | Age: 30
End: 2020-06-03
Attending: OBSTETRICS & GYNECOLOGY
Payer: COMMERCIAL

## 2020-06-03 DIAGNOSIS — Z11.59 ENCOUNTER FOR SCREENING FOR OTHER VIRAL DISEASES: Primary | ICD-10-CM

## 2020-06-03 DIAGNOSIS — Q25.1 COARCTATION OF AORTA (PREDUCTAL) (POSTDUCTAL): ICD-10-CM

## 2020-06-03 DIAGNOSIS — O35.10X0 CHROMOSOMAL ABNORMALITY IN FETUS, AFFECTING MANAGEMENT OF MOTHER, ANTEPARTUM, SINGLE OR UNSPECIFIED FETUS: Primary | ICD-10-CM

## 2020-06-03 DIAGNOSIS — O35.10X0 CHROMOSOMAL ABNORMALITY IN FETUS, AFFECTING MANAGEMENT OF MOTHER, ANTEPARTUM, SINGLE OR UNSPECIFIED FETUS: ICD-10-CM

## 2020-06-03 DIAGNOSIS — O35.10X0 CHROMOSOMAL ABNORMALITY IN FETUS AFFECTING MANAGEMENT OF MOTHER, SINGLE OR UNSPECIFIED FETUS: Primary | ICD-10-CM

## 2020-06-03 PROCEDURE — 76819 FETAL BIOPHYS PROFIL W/O NST: CPT | Performed by: OBSTETRICS & GYNECOLOGY

## 2020-06-03 PROCEDURE — 99202 OFFICE O/P NEW SF 15 MIN: CPT | Mod: ZP | Performed by: PEDIATRICS

## 2020-06-03 PROCEDURE — G0463 HOSPITAL OUTPT CLINIC VISIT: HCPCS | Mod: ZF

## 2020-06-03 PROCEDURE — 76816 OB US FOLLOW-UP PER FETUS: CPT

## 2020-06-03 NOTE — PATIENT INSTRUCTIONS
PEDS CARDIOVASCULAR SURGERY  EXPLORER CLINIC  12TH FLR,EAST BLD  [address] [id number] [phone]      Cardiology Clinic   RN Care Coordinators, Maryellen Razo (Bre) or Paula Casillas  [phone]  Pediatric Call Center/Scheduling  [phone]    After Hours and Emergency Contact Number  [phone]  * Ask for the pediatric cardiologist on call         Prescription Renewals  The pharmacy must fax requests to [phone]  * Please allow 3-4 days for prescriptions to be authorized

## 2020-06-03 NOTE — LETTER
6/3/2020      RE: Brooke Vidal  [address]  [place] [address]       I met Daniel in my outpatient clinic today for 20 minutes with the vast majority of time spent discussing the possible surgical and non surgical options for her fetus. As for the last fetal echo she comes forward with a diagnosis of monosomy X and possible coarctation of aorta. I discussed with her (and with her  on speaker phone) that only at birth we will know exactly how the arch of aorta looks like and surgical options may vary from nothing (just arch watching) to major repair from median sternotomy on cardiopulmonary bypass.  I have explain in details the potential growth of the arch after birth and what will happen after birth in timely sequence. Both Daniel and her  were extremely grateful for my explanations and future plans. I advised them to reach out if they have more questions otherwise I will talk to them on Monday 15th June when she will come for induction.  Massimo Griselli MD Massimo Griselli, MD

## 2020-06-03 NOTE — NURSING NOTE
Daniel seen in clinic today for follow up ultrasound and BPP at 37w4d gestation due to pregnancy c/b + NIPT monosomy X (see report/notes). Pt continues care with Dr. Joiner with plans to deliver at [place]. Will continue ultrasounds 2x/week with IOL on 6/15 at 0800. IOL instructions given to pt. Pt aware to call 30-60 min prior to IOL. Pt is meeting with Dr. Griselli tdoay. Dr. Delatorre met with pt and discussed POC. Pt discharged stable and ambulatory.       Renetta Galdamez RN

## 2020-06-04 NOTE — PROGRESS NOTES
I met Daniel in my outpatient clinic today for 20 minutes with the vast majority of time spent discussing the possible surgical and non surgical options for her fetus. As for the last fetal echo she comes forward with a diagnosis of monosomy X and possible coarctation of aorta. I discussed with her (and with her  on speaker phone) that only at birth we will know exactly how the arch of aorta looks like and surgical options may vary from nothing (just arch watching) to major repair from median sternotomy on cardiopulmonary bypass.  I have explain in details the potential growth of the arch after birth and what will happen after birth in timely sequence. Both Daniel and her  were extremely grateful for my explanations and future plans. I advised them to reach out if they have more questions otherwise I will talk to them on Monday 15th June when she will come for induction.  Massimo Griselli MD

## 2020-06-05 ENCOUNTER — OFFICE VISIT (OUTPATIENT)
Dept: MATERNAL FETAL MEDICINE | Facility: CLINIC | Age: 30
End: 2020-06-05
Attending: OBSTETRICS & GYNECOLOGY
Payer: COMMERCIAL

## 2020-06-05 ENCOUNTER — HOSPITAL ENCOUNTER (OUTPATIENT)
Dept: ULTRASOUND IMAGING | Facility: CLINIC | Age: 30
End: 2020-06-05
Attending: OBSTETRICS & GYNECOLOGY
Payer: COMMERCIAL

## 2020-06-05 DIAGNOSIS — O35.8XX0 CYSTIC HYGROMA OF FETUS IN SINGLETON PREGNANCY: ICD-10-CM

## 2020-06-05 DIAGNOSIS — O35.10X0 CHROMOSOMAL ABNORMALITY IN FETUS, AFFECTING MANAGEMENT OF MOTHER, ANTEPARTUM, SINGLE OR UNSPECIFIED FETUS: Primary | ICD-10-CM

## 2020-06-05 DIAGNOSIS — O35.10X0 CHROMOSOMAL ABNORMALITY IN FETUS, AFFECTING MANAGEMENT OF MOTHER, ANTEPARTUM, SINGLE OR UNSPECIFIED FETUS: ICD-10-CM

## 2020-06-05 PROCEDURE — 76819 FETAL BIOPHYS PROFIL W/O NST: CPT

## 2020-06-05 NOTE — PROGRESS NOTES
"Please see \"Imaging\" tab under \"Chart Review\" for details of today's US at the [facility].    Charlie Knapp MD  Maternal-Fetal Medicine    "

## 2020-06-09 ENCOUNTER — OFFICE VISIT (OUTPATIENT)
Dept: MATERNAL FETAL MEDICINE | Facility: CLINIC | Age: 30
End: 2020-06-09
Attending: OBSTETRICS & GYNECOLOGY
Payer: COMMERCIAL

## 2020-06-09 ENCOUNTER — HOSPITAL ENCOUNTER (OUTPATIENT)
Dept: ULTRASOUND IMAGING | Facility: CLINIC | Age: 30
End: 2020-06-09
Attending: OBSTETRICS & GYNECOLOGY
Payer: COMMERCIAL

## 2020-06-09 DIAGNOSIS — O35.10X0 CHROMOSOMAL ABNORMALITY IN FETUS, AFFECTING MANAGEMENT OF MOTHER, ANTEPARTUM, SINGLE OR UNSPECIFIED FETUS: Primary | ICD-10-CM

## 2020-06-09 DIAGNOSIS — O35.10X0 CHROMOSOMAL ABNORMALITY IN FETUS, AFFECTING MANAGEMENT OF MOTHER, ANTEPARTUM, SINGLE OR UNSPECIFIED FETUS: ICD-10-CM

## 2020-06-09 PROCEDURE — 76816 OB US FOLLOW-UP PER FETUS: CPT

## 2020-06-09 PROCEDURE — 76818 FETAL BIOPHYS PROFILE W/NST: CPT | Performed by: OBSTETRICS & GYNECOLOGY

## 2020-06-09 PROCEDURE — 59025 FETAL NON-STRESS TEST: CPT | Mod: ZF | Performed by: OBSTETRICS & GYNECOLOGY

## 2020-06-09 NOTE — NURSING NOTE
NST Performed due to BPP 6/8.   reviewed efm tracing and spoke with patient. See NST/BPP Doc Flowsheet tab. Patient discharged after MD review.

## 2020-06-09 NOTE — PROGRESS NOTES
Please see full imaging report from ViewPoint program under imaging tab.    BPP 8/10    Willam Mariano MD  Maternal Fetal Medicine

## 2020-06-12 ENCOUNTER — HOSPITAL ENCOUNTER (OUTPATIENT)
Dept: ULTRASOUND IMAGING | Facility: CLINIC | Age: 30
End: 2020-06-12
Attending: OBSTETRICS & GYNECOLOGY
Payer: COMMERCIAL

## 2020-06-12 ENCOUNTER — OFFICE VISIT (OUTPATIENT)
Dept: MATERNAL FETAL MEDICINE | Facility: CLINIC | Age: 30
End: 2020-06-12
Attending: OBSTETRICS & GYNECOLOGY
Payer: COMMERCIAL

## 2020-06-12 DIAGNOSIS — O35.10X0 CHROMOSOMAL ABNORMALITY IN FETUS, AFFECTING MANAGEMENT OF MOTHER, ANTEPARTUM, SINGLE OR UNSPECIFIED FETUS: ICD-10-CM

## 2020-06-12 DIAGNOSIS — Z11.59 ENCOUNTER FOR SCREENING FOR OTHER VIRAL DISEASES: ICD-10-CM

## 2020-06-12 DIAGNOSIS — O35.10X0 CHROMOSOMAL ABNORMALITY IN FETUS, AFFECTING MANAGEMENT OF MOTHER, ANTEPARTUM, SINGLE OR UNSPECIFIED FETUS: Primary | ICD-10-CM

## 2020-06-12 LAB
SARS-COV-2 RNA SPEC QL NAA+PROBE: NOT DETECTED
SPECIMEN SOURCE: NORMAL

## 2020-06-12 PROCEDURE — 76818 FETAL BIOPHYS PROFILE W/NST: CPT

## 2020-06-12 PROCEDURE — 59025 FETAL NON-STRESS TEST: CPT | Mod: ZF | Performed by: OBSTETRICS & GYNECOLOGY

## 2020-06-12 PROCEDURE — 99000 SPECIMEN HANDLING OFFICE-LAB: CPT | Performed by: OBSTETRICS & GYNECOLOGY

## 2020-06-12 PROCEDURE — 99207 ZZC NO BILLABLE SERVICE THIS VISIT: CPT

## 2020-06-12 PROCEDURE — U0003 INFECTIOUS AGENT DETECTION BY NUCLEIC ACID (DNA OR RNA); SEVERE ACUTE RESPIRATORY SYNDROME CORONAVIRUS 2 (SARS-COV-2) (CORONAVIRUS DISEASE [COVID-19]), AMPLIFIED PROBE TECHNIQUE, MAKING USE OF HIGH THROUGHPUT TECHNOLOGIES AS DESCRIBED BY CMS-2020-01-R: HCPCS | Mod: 90 | Performed by: OBSTETRICS & GYNECOLOGY

## 2020-06-12 NOTE — PROGRESS NOTES
"Please see \"Imaging\" tab under \"Chart Review\" for details of today's US at the [facility].    Charlie Knapp MD  Maternal-Fetal Medicine    "

## 2020-06-14 NOTE — H&P
L&D History and Physical   Whit 15, 2020  Brooke Vidal  [id number]      HPI: Brooke Vidal is a 30 year old  at 39w1d by LMP c/w 8w5 US who presents today for IOL for fetal Wilburn Syndrome.    She states that she is feeling well today.  She denies headache, vision changes, chest pain, shortness of breath, fever, chills, nausea, vomiting or other systemic complaints. She denies vaginal bleeding or loss of fluid and is feeling normal fetal movement.      Her pregnancy has been complicated by:  -Fetal monosomy X on NIPT (Wilburn Syndrome)    ROS: No headaches, vision changes, nausea, vomiting, fevers, chills, chest pain, SOB, abdominal pain, constipation, diarrhea, dysuria, changes in vaginal discharge or edema in extremities noted.     OBHX:   OB History    Para Term  AB Living   2 1 1 0 0 1   SAB TAB Ectopic Multiple Live Births   0 0 0 0 1      # Outcome Date GA Lbr Brennon/2nd Weight Sex Delivery Anes PTL Lv   2 Current            1 Term 10/13/17 40w2d 06:55 / 01:32 4.45 kg (9 lb 13 oz) M   N SERENE      Name: JOLENE,BABYRiccardo ELY      Apgar1: 8  Apgar5: 9       No past medical history on file.    No past surgical history on file.    Medications:   No current facility-administered medications on file prior to encounter.   Prenatal Vit-Fe Fumarate-FA (PRENATAL MULTIVITAMIN PLUS IRON) 27-0.8 MG TABS per tablet, Take 1 tablet by mouth daily  ferrous sulfate (IRON) 325 (65 FE) MG tablet, Take 325 mg by mouth daily (with breakfast)  ibuprofen (ADVIL/MOTRIN) 600 MG tablet, Take 1 tablet (600 mg) by mouth every 6 hours as needed for moderate pain        Not on File    No family history on file.    SocialHX:   Social History     Tobacco Use     Smoking status: Never Smoker     Smokeless tobacco: Never Used   Substance Use Topics     Alcohol use: No     Drug use: No       ROS: 10-point ROS negative except as indicated in HPI.    Physical Exam:  Vitals:    06/15/20 0831 06/15/20 0837   BP: 122/68    Resp:  16    Temp:  97.9  F (36.6  C)   TempSrc:  Oral     General: alert, oriented female, resting in bed in NAD  CV: regular rate and rhythm  Lungs: clear bilaterally, no crackles or wheezes.  Abdomen: soft, gravid, non-tender, EFW 8#.  Extremities: bilateral lower extremities non-tender with trace edema    SVE: C/L/H  Membranes: intact    Presentation: ceph by BSUS    FHT: baseline 120, moderate variability, accelerations present, decelerations absent  [place]: 1-3 contractions in ten min    Prenatal Labs:   Lab Results   Component Value Date    ABO A 06/15/2020    RH Neg 06/15/2020    AS Pos (A) 06/15/2020    HEPBANG Negative 2019    TREPAB Negative 10/13/2017    HGB 10.9 (L) 06/15/2020       GBS Status:   Lab Results   Component Value Date    GBS Negative 2020       No results found for: PAP    Labs:   Results for orders placed or performed during the hospital encounter of 06/15/20 (from the past 24 hour(s))   Platelet count   Result Value Ref Range    Platelet Count 147 (L) 150 - 450 10e9/L   Hemoglobin   Result Value Ref Range    Hemoglobin 10.9 (L) 11.7 - 15.7 g/dL   ABO/Rh type and screen   Result Value Ref Range    ABO A     RH(D) Neg     Antibody Screen Pos (A)     Test Valid Only At          [facility]    Specimen Expires 2020        Assessment: 30 year old  at 39w1d by LMP c/w 8w5d US, here for IOL for fetal Wilburn Syndrome. Posterior placenta. EFW 8#.   Pregnancy also complicated by:  Fetal Wilburn Syndrome    Plan:    Pregnancy  - Admit to labor and delivery for   - GBS negative; antibiotics not indicated   - Rh neg- rhogam evaluation PP, s/p rhogam at 28w; Rubella immune    #-Fetal Monosomy X on NIPT   - NICU present at delivery, with immediate echocardiogram and peds cards consult.   - Fetal CHD, coarctation on fetal echo  - Fetal skin edema    Fetal well-being  - Category 1 FHT  - EFW 8# ; 3266g @ 36w3d on , AC >95%ile    Hx   -  4.45kg (9lbs 13 oz)@ 40w2d       # Pain Assessment:  Current Pain Score 6/15/2020   Patient currently in pain? denies   Pain score (0-10) -   Pain location -   Pain descriptors -   Brooke farmer pain level was assessed and she currently denies pain.        Patient seen and care plan discussed under supervision of Dr. Dangelo.    Mi Lange [facility], MD  Obstetrics and Gyncology, PGY-2  Whit 15, 2020 , 8:55 AM      The patient was seen and evaluated by me separately from the team.  I have reviewed and agree with the above assessment and plan of care.    Cely Dangelo MD, FACOG

## 2020-06-15 ENCOUNTER — HOSPITAL ENCOUNTER (INPATIENT)
Facility: CLINIC | Age: 30
LOS: 2 days | Discharge: HOME HEALTH | End: 2020-06-17
Attending: OBSTETRICS & GYNECOLOGY | Admitting: OBSTETRICS & GYNECOLOGY
Payer: COMMERCIAL

## 2020-06-15 ENCOUNTER — ANESTHESIA (OUTPATIENT)
Dept: OBGYN | Facility: CLINIC | Age: 30
End: 2020-06-15
Payer: COMMERCIAL

## 2020-06-15 ENCOUNTER — ANESTHESIA EVENT (OUTPATIENT)
Dept: OBGYN | Facility: CLINIC | Age: 30
End: 2020-06-15
Payer: COMMERCIAL

## 2020-06-15 LAB
ABO + RH BLD: ABNORMAL
ABO + RH BLD: ABNORMAL
BLD GP AB INVEST PLASRBC-IMP: ABNORMAL
BLD GP AB SCN SERPL QL: ABNORMAL
BLOOD BANK CMNT PATIENT-IMP: ABNORMAL
BLOOD BANK CMNT PATIENT-IMP: ABNORMAL
HGB BLD-MCNC: 10.9 G/DL (ref 11.7–15.7)
PLATELET # BLD AUTO: 147 10E9/L (ref 150–450)
SPECIMEN EXP DATE BLD: ABNORMAL

## 2020-06-15 PROCEDURE — 86850 RBC ANTIBODY SCREEN: CPT | Performed by: OBSTETRICS & GYNECOLOGY

## 2020-06-15 PROCEDURE — 25800030 ZZH RX IP 258 OP 636: Performed by: STUDENT IN AN ORGANIZED HEALTH CARE EDUCATION/TRAINING PROGRAM

## 2020-06-15 PROCEDURE — 86870 RBC ANTIBODY IDENTIFICATION: CPT | Performed by: OBSTETRICS & GYNECOLOGY

## 2020-06-15 PROCEDURE — 00HU33Z INSERTION OF INFUSION DEVICE INTO SPINAL CANAL, PERCUTANEOUS APPROACH: ICD-10-PCS | Performed by: ANESTHESIOLOGY

## 2020-06-15 PROCEDURE — 25000132 ZZH RX MED GY IP 250 OP 250 PS 637: Performed by: STUDENT IN AN ORGANIZED HEALTH CARE EDUCATION/TRAINING PROGRAM

## 2020-06-15 PROCEDURE — 12000001 ZZH R&B MED SURG/OB UMMC

## 2020-06-15 PROCEDURE — 25000125 ZZHC RX 250: Performed by: STUDENT IN AN ORGANIZED HEALTH CARE EDUCATION/TRAINING PROGRAM

## 2020-06-15 PROCEDURE — 3E0P7VZ INTRODUCTION OF HORMONE INTO FEMALE REPRODUCTIVE, VIA NATURAL OR ARTIFICIAL OPENING: ICD-10-PCS | Performed by: OBSTETRICS & GYNECOLOGY

## 2020-06-15 PROCEDURE — 85049 AUTOMATED PLATELET COUNT: CPT | Performed by: OBSTETRICS & GYNECOLOGY

## 2020-06-15 PROCEDURE — 25000128 H RX IP 250 OP 636: Performed by: STUDENT IN AN ORGANIZED HEALTH CARE EDUCATION/TRAINING PROGRAM

## 2020-06-15 PROCEDURE — 86900 BLOOD TYPING SEROLOGIC ABO: CPT | Performed by: OBSTETRICS & GYNECOLOGY

## 2020-06-15 PROCEDURE — 85018 HEMOGLOBIN: CPT | Performed by: OBSTETRICS & GYNECOLOGY

## 2020-06-15 PROCEDURE — 86901 BLOOD TYPING SEROLOGIC RH(D): CPT | Performed by: OBSTETRICS & GYNECOLOGY

## 2020-06-15 PROCEDURE — 86780 TREPONEMA PALLIDUM: CPT | Performed by: OBSTETRICS & GYNECOLOGY

## 2020-06-15 PROCEDURE — 3E0R3BZ INTRODUCTION OF ANESTHETIC AGENT INTO SPINAL CANAL, PERCUTANEOUS APPROACH: ICD-10-PCS | Performed by: ANESTHESIOLOGY

## 2020-06-15 RX ORDER — OXYCODONE AND ACETAMINOPHEN 5; 325 MG/1; MG/1
1 TABLET ORAL
Status: DISCONTINUED | OUTPATIENT
Start: 2020-06-15 | End: 2020-06-16

## 2020-06-15 RX ORDER — IBUPROFEN 800 MG/1
800 TABLET, FILM COATED ORAL
Status: DISCONTINUED | OUTPATIENT
Start: 2020-06-15 | End: 2020-06-16

## 2020-06-15 RX ORDER — EPHEDRINE SULFATE 50 MG/ML
5 INJECTION, SOLUTION INTRAMUSCULAR; INTRAVENOUS; SUBCUTANEOUS
Status: DISCONTINUED | OUTPATIENT
Start: 2020-06-15 | End: 2020-06-16

## 2020-06-15 RX ORDER — FENTANYL/BUPIVACAINE/NS/PF 2-1250MCG
PLASTIC BAG, INJECTION (ML) INJECTION
Status: DISCONTINUED
Start: 2020-06-15 | End: 2020-06-16 | Stop reason: HOSPADM

## 2020-06-15 RX ORDER — NALOXONE HYDROCHLORIDE 0.4 MG/ML
.1-.4 INJECTION, SOLUTION INTRAMUSCULAR; INTRAVENOUS; SUBCUTANEOUS
Status: DISCONTINUED | OUTPATIENT
Start: 2020-06-15 | End: 2020-06-15

## 2020-06-15 RX ORDER — OXYTOCIN/0.9 % SODIUM CHLORIDE 30/500 ML
100-340 PLASTIC BAG, INJECTION (ML) INTRAVENOUS CONTINUOUS PRN
Status: COMPLETED | OUTPATIENT
Start: 2020-06-15 | End: 2020-06-16

## 2020-06-15 RX ORDER — ACETAMINOPHEN 325 MG/1
650 TABLET ORAL EVERY 4 HOURS PRN
Status: DISCONTINUED | OUTPATIENT
Start: 2020-06-15 | End: 2020-06-16

## 2020-06-15 RX ORDER — CARBOPROST TROMETHAMINE 250 UG/ML
250 INJECTION, SOLUTION INTRAMUSCULAR
Status: DISCONTINUED | OUTPATIENT
Start: 2020-06-15 | End: 2020-06-16

## 2020-06-15 RX ORDER — OXYTOCIN 10 [USP'U]/ML
10 INJECTION, SOLUTION INTRAMUSCULAR; INTRAVENOUS
Status: DISCONTINUED | OUTPATIENT
Start: 2020-06-15 | End: 2020-06-16

## 2020-06-15 RX ORDER — EPHEDRINE SULFATE 50 MG/ML
INJECTION, SOLUTION INTRAMUSCULAR; INTRAVENOUS; SUBCUTANEOUS
Status: DISCONTINUED
Start: 2020-06-15 | End: 2020-06-15 | Stop reason: WASHOUT

## 2020-06-15 RX ORDER — NALBUPHINE HYDROCHLORIDE 20 MG/ML
2.5-5 INJECTION, SOLUTION INTRAMUSCULAR; INTRAVENOUS; SUBCUTANEOUS EVERY 6 HOURS PRN
Status: DISCONTINUED | OUTPATIENT
Start: 2020-06-15 | End: 2020-06-16

## 2020-06-15 RX ORDER — MISOPROSTOL 100 UG/1
25 TABLET ORAL EVERY 4 HOURS PRN
Status: DISCONTINUED | OUTPATIENT
Start: 2020-06-15 | End: 2020-06-16

## 2020-06-15 RX ORDER — CALCIUM CARBONATE 500 MG/1
500 TABLET, CHEWABLE ORAL DAILY PRN
Status: DISCONTINUED | OUTPATIENT
Start: 2020-06-15 | End: 2020-06-16

## 2020-06-15 RX ORDER — METHYLERGONOVINE MALEATE 0.2 MG/ML
200 INJECTION INTRAVENOUS
Status: DISCONTINUED | OUTPATIENT
Start: 2020-06-15 | End: 2020-06-16

## 2020-06-15 RX ORDER — ONDANSETRON 2 MG/ML
4 INJECTION INTRAMUSCULAR; INTRAVENOUS EVERY 6 HOURS PRN
Status: DISCONTINUED | OUTPATIENT
Start: 2020-06-15 | End: 2020-06-16

## 2020-06-15 RX ORDER — SODIUM CHLORIDE, SODIUM LACTATE, POTASSIUM CHLORIDE, CALCIUM CHLORIDE 600; 310; 30; 20 MG/100ML; MG/100ML; MG/100ML; MG/100ML
INJECTION, SOLUTION INTRAVENOUS CONTINUOUS
Status: DISCONTINUED | OUTPATIENT
Start: 2020-06-15 | End: 2020-06-16

## 2020-06-15 RX ORDER — NALOXONE HYDROCHLORIDE 0.4 MG/ML
.1-.4 INJECTION, SOLUTION INTRAMUSCULAR; INTRAVENOUS; SUBCUTANEOUS
Status: DISCONTINUED | OUTPATIENT
Start: 2020-06-15 | End: 2020-06-16

## 2020-06-15 RX ADMIN — CALCIUM CARBONATE (ANTACID) CHEW TAB 500 MG 500 MG: 500 CHEW TAB at 09:06

## 2020-06-15 RX ADMIN — Medication 8 ML: at 22:55

## 2020-06-15 RX ADMIN — SODIUM CHLORIDE, POTASSIUM CHLORIDE, SODIUM LACTATE AND CALCIUM CHLORIDE 1000 ML: 600; 310; 30; 20 INJECTION, SOLUTION INTRAVENOUS at 21:22

## 2020-06-15 RX ADMIN — Medication 25 MCG: at 10:22

## 2020-06-15 RX ADMIN — Medication 25 MCG: at 14:37

## 2020-06-15 RX ADMIN — ONDANSETRON 4 MG: 2 INJECTION INTRAMUSCULAR; INTRAVENOUS at 23:54

## 2020-06-15 RX ADMIN — SODIUM CHLORIDE, POTASSIUM CHLORIDE, SODIUM LACTATE AND CALCIUM CHLORIDE 500 ML: 600; 310; 30; 20 INJECTION, SOLUTION INTRAVENOUS at 20:02

## 2020-06-15 RX ADMIN — Medication: at 23:17

## 2020-06-15 RX ADMIN — Medication 25 MCG: at 18:59

## 2020-06-15 NOTE — PLAN OF CARE
Data: Patient admitted to room 456 at 0815. Patient is a . Prenatal record reviewed.   OB History    Para Term  AB Living   2 1 1 0 0 1   SAB TAB Ectopic Multiple Live Births   0 0 0 0 1      # Outcome Date GA Lbr Brennon/2nd Weight Sex Delivery Anes PTL Lv   2 Current            1 Term 10/13/17 40w2d 06:55 / 01:32 4.45 kg (9 lb 13 oz) M   N SERENE      Name: JOLENE,JUSTIN ELY      Apgar1: 8  Apgar5: 9   .  Medical History: No past medical history on file..  Gestational age 39w2d. Vital signs per doc flowsheet. Fetal movement present. Patient reports Induction Of Labor   as reason for admission. Support persons is present.  Action:  Care of patient assumed at 0815 Verbal consent for EFM, external fetal monitors applied. Admission assessment completed. Patient and support persons educated on labor process. Patient instructed to report change in fetal movement, contractions, vaginal leaking of fluid or bleeding, abdominal pain, or any concerns related to the pregnancy to her nurse/physician. Patient oriented to room, call light in reach.   Response:  informed of patient arrival. Plan per provider is as ordered. Patient verbalized understanding of education and verbalized agreement with plan. Patient coping with labor via family support.

## 2020-06-15 NOTE — PROGRESS NOTES
Labor Progress Note  S: patient doing well. Occasional cramping    /58   Temp 98.3  F (36.8  C)   Resp 16   LMP 2019     Baseline 135, moderate variability, accels, no decels  Irritability on toco    Assessment: 30 year old  at 39w1d by LMP c/w 8w5d US, here for IOL for fetal Wilburn Syndrome. Posterior placenta. EFW 8#.   Pregnancy also complicated by:  Fetal Wilburn Syndrome     Plan:     Induction of labor:  - s/p 2x vaginal miso. Continue q 4 hour. Pitocin when favorable.  - GBS negative; antibiotics not indicated   - Rh neg- rhogam evaluation PP, s/p rhogam at 28w; Rubella immune     #-Fetal Monosomy X on NIPT   - NICU present at delivery, with immediate echocardiogram and peds cards consult.   - Fetal CHD, coarctation on fetal echo  - Fetal skin edema     Fetal well-being  - Category 1 FHT  - EFW 8# ; 3266g @ 36w3d on , AC >95%ile    Hx   - 4.45kg (9lbs 13 oz)@ 40w2d     Li Avila MD  Obstetrics and Gynecology, PGY-2  06/15/20. 4:54 PM

## 2020-06-15 NOTE — PROGRESS NOTES
Labor Progress Note  S: patient doing well. Overall very comfortable.    /58   Temp 98.3  F (36.8  C)   Resp 16   LMP 2019     Baseline 135, moderate variability, accels, no decels  [place]: 2-4 ctx in 10 min    Assessment: 30 year old  at 39w1d by LMP c/w 8w5d US, here for IOL for fetal Wilburn Syndrome. Posterior placenta. EFW 8#.   Pregnancy also complicated by:  Fetal Wilburn Syndrome     Plan:  Induction of labor:  - s/p 2x vaginal miso. Place next dose as able. Assess for bills following next dose.  - GBS negative; antibiotics not indicated   - Rh neg- rhogam evaluation PP, s/p rhogam at 28w; Rubella immune     #-Fetal Monosomy X on NIPT   - NICU present at delivery, with immediate echocardiogram and peds cards consult.   - Fetal CHD, coarctation on fetal echo  - Fetal skin edema     Fetal well-being  - Category 1 FHT  - EFW 8# ; 3266g @ 36w3d on , AC >95%ile    Hx   - 4.45kg (9lbs 13 oz)@ 40w2d     Li Avila MD  Obstetrics and Gynecology, PGY-2  06/15/20. 7:01 PM

## 2020-06-16 LAB
BLOOD BANK CMNT PATIENT-IMP: NORMAL
BLOOD BANK CMNT PATIENT-IMP: NORMAL
T PALLIDUM AB SER QL: NONREACTIVE

## 2020-06-16 PROCEDURE — 0KQM0ZZ REPAIR PERINEUM MUSCLE, OPEN APPROACH: ICD-10-PCS | Performed by: OBSTETRICS & GYNECOLOGY

## 2020-06-16 PROCEDURE — 25800030 ZZH RX IP 258 OP 636: Performed by: STUDENT IN AN ORGANIZED HEALTH CARE EDUCATION/TRAINING PROGRAM

## 2020-06-16 PROCEDURE — 12000001 ZZH R&B MED SURG/OB UMMC

## 2020-06-16 PROCEDURE — 25000125 ZZHC RX 250: Performed by: STUDENT IN AN ORGANIZED HEALTH CARE EDUCATION/TRAINING PROGRAM

## 2020-06-16 PROCEDURE — 25000132 ZZH RX MED GY IP 250 OP 250 PS 637: Performed by: STUDENT IN AN ORGANIZED HEALTH CARE EDUCATION/TRAINING PROGRAM

## 2020-06-16 PROCEDURE — 86900 BLOOD TYPING SEROLOGIC ABO: CPT | Performed by: OBSTETRICS & GYNECOLOGY

## 2020-06-16 PROCEDURE — 72200001 ZZH LABOR CARE VAGINAL DELIVERY SINGLE

## 2020-06-16 PROCEDURE — 25000125 ZZHC RX 250

## 2020-06-16 PROCEDURE — 40000977 ZZH STATISTIC ATTENDANCE AT DELIVERY

## 2020-06-16 PROCEDURE — 10907ZC DRAINAGE OF AMNIOTIC FLUID, THERAPEUTIC FROM PRODUCTS OF CONCEPTION, VIA NATURAL OR ARTIFICIAL OPENING: ICD-10-PCS | Performed by: OBSTETRICS & GYNECOLOGY

## 2020-06-16 PROCEDURE — 25000128 H RX IP 250 OP 636: Performed by: STUDENT IN AN ORGANIZED HEALTH CARE EDUCATION/TRAINING PROGRAM

## 2020-06-16 RX ORDER — OXYTOCIN/0.9 % SODIUM CHLORIDE 30/500 ML
340 PLASTIC BAG, INJECTION (ML) INTRAVENOUS CONTINUOUS PRN
Status: DISCONTINUED | OUTPATIENT
Start: 2020-06-16 | End: 2020-06-17 | Stop reason: HOSPADM

## 2020-06-16 RX ORDER — AMOXICILLIN 250 MG
1 CAPSULE ORAL 2 TIMES DAILY
Status: DISCONTINUED | OUTPATIENT
Start: 2020-06-16 | End: 2020-06-17 | Stop reason: HOSPADM

## 2020-06-16 RX ORDER — NALOXONE HYDROCHLORIDE 0.4 MG/ML
.1-.4 INJECTION, SOLUTION INTRAMUSCULAR; INTRAVENOUS; SUBCUTANEOUS
Status: DISCONTINUED | OUTPATIENT
Start: 2020-06-16 | End: 2020-06-17 | Stop reason: HOSPADM

## 2020-06-16 RX ORDER — HYDROCORTISONE 2.5 %
CREAM (GRAM) TOPICAL 3 TIMES DAILY PRN
Status: DISCONTINUED | OUTPATIENT
Start: 2020-06-16 | End: 2020-06-17 | Stop reason: HOSPADM

## 2020-06-16 RX ORDER — ACETAMINOPHEN 325 MG/1
650 TABLET ORAL EVERY 4 HOURS PRN
Status: DISCONTINUED | OUTPATIENT
Start: 2020-06-16 | End: 2020-06-17 | Stop reason: HOSPADM

## 2020-06-16 RX ORDER — OXYTOCIN 10 [USP'U]/ML
INJECTION, SOLUTION INTRAMUSCULAR; INTRAVENOUS
Status: DISCONTINUED
Start: 2020-06-16 | End: 2020-06-16 | Stop reason: WASHOUT

## 2020-06-16 RX ORDER — MISOPROSTOL 200 UG/1
TABLET ORAL
Status: DISCONTINUED
Start: 2020-06-16 | End: 2020-06-16 | Stop reason: WASHOUT

## 2020-06-16 RX ORDER — IBUPROFEN 800 MG/1
800 TABLET, FILM COATED ORAL EVERY 6 HOURS PRN
Status: DISCONTINUED | OUTPATIENT
Start: 2020-06-16 | End: 2020-06-17 | Stop reason: HOSPADM

## 2020-06-16 RX ORDER — MISOPROSTOL 200 UG/1
800 TABLET ORAL
Status: DISCONTINUED | OUTPATIENT
Start: 2020-06-16 | End: 2020-06-17 | Stop reason: HOSPADM

## 2020-06-16 RX ORDER — CARBOPROST TROMETHAMINE 250 UG/ML
250 INJECTION, SOLUTION INTRAMUSCULAR
Status: DISCONTINUED | OUTPATIENT
Start: 2020-06-16 | End: 2020-06-17 | Stop reason: HOSPADM

## 2020-06-16 RX ORDER — AMOXICILLIN 250 MG
2 CAPSULE ORAL 2 TIMES DAILY
Status: DISCONTINUED | OUTPATIENT
Start: 2020-06-16 | End: 2020-06-17 | Stop reason: HOSPADM

## 2020-06-16 RX ORDER — BISACODYL 10 MG
10 SUPPOSITORY, RECTAL RECTAL DAILY PRN
Status: DISCONTINUED | OUTPATIENT
Start: 2020-06-18 | End: 2020-06-17 | Stop reason: HOSPADM

## 2020-06-16 RX ORDER — OXYTOCIN/0.9 % SODIUM CHLORIDE 30/500 ML
PLASTIC BAG, INJECTION (ML) INTRAVENOUS
Status: COMPLETED
Start: 2020-06-16 | End: 2020-06-16

## 2020-06-16 RX ORDER — METHYLERGONOVINE MALEATE 0.2 MG/ML
200 INJECTION INTRAVENOUS
Status: DISCONTINUED | OUTPATIENT
Start: 2020-06-16 | End: 2020-06-17 | Stop reason: HOSPADM

## 2020-06-16 RX ORDER — ONDANSETRON 2 MG/ML
4 INJECTION INTRAMUSCULAR; INTRAVENOUS EVERY 6 HOURS PRN
Status: DISCONTINUED | OUTPATIENT
Start: 2020-06-16 | End: 2020-06-17 | Stop reason: HOSPADM

## 2020-06-16 RX ORDER — MODIFIED LANOLIN
OINTMENT (GRAM) TOPICAL
Status: DISCONTINUED | OUTPATIENT
Start: 2020-06-16 | End: 2020-06-17 | Stop reason: HOSPADM

## 2020-06-16 RX ORDER — OXYTOCIN/0.9 % SODIUM CHLORIDE 30/500 ML
100 PLASTIC BAG, INJECTION (ML) INTRAVENOUS CONTINUOUS
Status: DISCONTINUED | OUTPATIENT
Start: 2020-06-16 | End: 2020-06-17 | Stop reason: HOSPADM

## 2020-06-16 RX ORDER — LIDOCAINE HYDROCHLORIDE 10 MG/ML
INJECTION, SOLUTION EPIDURAL; INFILTRATION; INTRACAUDAL; PERINEURAL
Status: DISCONTINUED
Start: 2020-06-16 | End: 2020-06-16 | Stop reason: HOSPADM

## 2020-06-16 RX ORDER — OXYTOCIN 10 [USP'U]/ML
10 INJECTION, SOLUTION INTRAMUSCULAR; INTRAVENOUS
Status: DISCONTINUED | OUTPATIENT
Start: 2020-06-16 | End: 2020-06-17 | Stop reason: HOSPADM

## 2020-06-16 RX ADMIN — LIDOCAINE HYDROCHLORIDE 20 ML: 10 INJECTION, SOLUTION EPIDURAL; INFILTRATION; INTRACAUDAL; PERINEURAL at 04:19

## 2020-06-16 RX ADMIN — ACETAMINOPHEN 650 MG: 325 TABLET, FILM COATED ORAL at 12:29

## 2020-06-16 RX ADMIN — IBUPROFEN 800 MG: 800 TABLET, FILM COATED ORAL at 23:18

## 2020-06-16 RX ADMIN — CALCIUM CARBONATE (ANTACID) CHEW TAB 500 MG 500 MG: 500 CHEW TAB at 02:33

## 2020-06-16 RX ADMIN — IBUPROFEN 800 MG: 800 TABLET, FILM COATED ORAL at 17:08

## 2020-06-16 RX ADMIN — ACETAMINOPHEN 650 MG: 325 TABLET, FILM COATED ORAL at 16:37

## 2020-06-16 RX ADMIN — SODIUM CHLORIDE, POTASSIUM CHLORIDE, SODIUM LACTATE AND CALCIUM CHLORIDE 250 ML: 600; 310; 30; 20 INJECTION, SOLUTION INTRAVENOUS at 01:42

## 2020-06-16 RX ADMIN — ACETAMINOPHEN 650 MG: 325 TABLET, FILM COATED ORAL at 08:26

## 2020-06-16 RX ADMIN — ONDANSETRON 4 MG: 2 INJECTION INTRAMUSCULAR; INTRAVENOUS at 06:39

## 2020-06-16 RX ADMIN — SODIUM CHLORIDE, POTASSIUM CHLORIDE, SODIUM LACTATE AND CALCIUM CHLORIDE 500 ML: 600; 310; 30; 20 INJECTION, SOLUTION INTRAVENOUS at 02:34

## 2020-06-16 RX ADMIN — Medication 100 ML/HR: at 04:40

## 2020-06-16 RX ADMIN — ACETAMINOPHEN 650 MG: 325 TABLET, FILM COATED ORAL at 20:39

## 2020-06-16 RX ADMIN — IBUPROFEN 800 MG: 800 TABLET, FILM COATED ORAL at 05:09

## 2020-06-16 RX ADMIN — Medication 340 ML/HR: at 04:08

## 2020-06-16 RX ADMIN — IBUPROFEN 800 MG: 800 TABLET, FILM COATED ORAL at 11:11

## 2020-06-16 RX ADMIN — DOCUSATE SODIUM AND SENNOSIDES 1 TABLET: 8.6; 5 TABLET, FILM COATED ORAL at 20:39

## 2020-06-16 RX ADMIN — DOCUSATE SODIUM AND SENNOSIDES 1 TABLET: 8.6; 5 TABLET, FILM COATED ORAL at 12:29

## 2020-06-16 NOTE — PROVIDER NOTIFICATION
06/16/20 0308   Provider Notification   Provider Name/Title SHAD Mattson CNM   Method of Notification At Bedside   Notification Reason SVE   SHAD Mattson at bedside assessing patients cervix due to providers request. Patient is now complete. Will update team and continue to monitor closely.

## 2020-06-16 NOTE — PROVIDER NOTIFICATION
"   06/16/20 0205   Provider Notification   Provider Name/Title Dr. Ayala   Method of Notification At Bedside   Notification Reason SVE;Pain   Provider asked to come to bedside due to patient feeling pressure with contractions and stating \"I feel like I have to push\". SVE 7-8/90/-2. Provider AROM patient, amniotic fluid was clear with blood, small amount.   "

## 2020-06-16 NOTE — PROGRESS NOTES
Progress Note  S: Patient feeling more rectal pressure.     /76   Temp 98.3  F (36.8  C) (Oral)   Resp 16   LMP 2019      SVE: 7/90/-2, AROM for clear fluid    Baseline 130, mixed minimal and moderate variability, accels, period of recurrent late decels. Recurrent variable decels following AROM.   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 Posterior placenta. EFW 8#.   Pregnancy also complicated by:  Fetal Wilburn Syndrome     Plan:  Induction of labor:  - s/p 3x vaginal miso. Now making spontaneous change.  - SVE prn.  - GBS negative; antibiotics not indicated   - Rh neg- rhogam evaluation PP, s/p rhogam at 28w; Rubella immune     #-Fetal Monosomy X on NIPT   - NICU present at delivery, with immediate echocardiogram and peds cards consult.   - Fetal CHD, coarctation on fetal echo  - Fetal skin edema     Fetal well-being  - Category 2 FHT for periods of recurrent late decels, recurrent variable decels following AROM. Monitor closely. IUPC with amnioinfusion prn. 250 ml bolus now. Continue with position changes as able.   Will continue to monitor closely. Reassess in 30 minutes.   - EFW 8# ; 3266g @ 36w3d on , AC >95%ile    Hx   - 4.45kg (9lbs 13 oz)@ 40w2d     Li Avila MD  Obstetrics and Gynecology, PGY-2  20. 2:16 AM

## 2020-06-16 NOTE — PROVIDER NOTIFICATION
06/15/20 1379   Provider Notification   Provider Name/Title Dr. Dumont   Method of Notification At Bedside   Notification Reason SVE;Status Update   Provider at bedside assessing patients labor status. Patient is now very comfortable after epidural was placed. Provider performed SVE 3/30/-3. Patient is donnie frequently on her own. Plan is to continue to monitor closely and start IV pitocin if contractions start to space out. Patient has been having occasional lates, strip reviewed with provider. Continue to reposition. Will update provider with any changes.

## 2020-06-16 NOTE — PROVIDER NOTIFICATION
06/16/20 0315   Provider Notification   Provider Name/Title Dr. Dumont   Method of Notification In Department   Notification Reason Labor Status;Decels   Provider aware that patient is now complete. Plan is to have patient labor down as long as FHR is adequate. Will continue to monitor closely and update provider with any changes.

## 2020-06-16 NOTE — LACTATION NOTE
This note was copied from a baby's chart.  D:  I met with Daniel at bedside this morning.  This little girl is her second baby, she  her first for one year.  Daniel is normally in good health, takes no medications, and has no history of breast/chest surgery or trauma. She has already started to pump.    I:  I gave her a folder of introductory materials and went over pumping guidelines.    We talked about hands on pumping techniques, hand expression and how to access the [employer] websites. We talked about latching her (she was sleepy with no cues), then she went skin to skin.  Daniel will call insurance to check on coverage for a pump.  A: Experienced mom, pumping for her new baby.  P:  Will continue to provide lactation support.      Cris Ling, RNC, IBCLC

## 2020-06-16 NOTE — ANESTHESIA PROCEDURE NOTES
Epidural Procedure Note  Staff -         Performed By: anesthesiologist and with residents          Date/Time: 6/15/2020 11:00 PM    Location: OB     Procedure start time:  6/15/2020 10:50 PM     Procedure end time:  6/15/2020 11:05 PM   Pre-procedure checklist:   patient identified, IV checked, site marked, risks and benefits discussed, informed consent, monitors and equipment checked, pre-op evaluation, at physician/surgeon's request and post-op pain management      Correct Patient: Yes      Correct Position: Yes      Correct Site: Yes      Correct Procedure: Yes      Correct Laterality:  Yes    Site Marked:  Yes  Procedure:     Procedure:  Epidural catheter    ASA:  2    Position:  Sitting    Sterile Prep: chloraprep      Insertion site:  L4-5    Local skin infiltration:  1% lidocaine    amount (mL):  3    Approach:  Midline    Needle gauge (G):  17    Needle Length (in):  5    Block Needle Type:  Gautamuhkristen    Injection Technique:  LORT saline    JEANNA at (cm):  5    Attempts:  1    Redirects:  0    Catheter gauge (G):  19    Catheter threaded easily: Yes      Threaded to cm at skin:  9.5    Paresthesias:  No    Aspiration negative for Heme or CSF: Yes      Test dose (mL):  3     Local anesthetic:  Lidocaine 1.5% w/ 1:200,000 epinephrine    Test dose time:  22:55    Test dose negative for signs of intravascular, subdural or intrathecal injection: Yes

## 2020-06-16 NOTE — PROGRESS NOTES
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 Posterior placenta. EFW 8#.   Pregnancy also complicated by:  Fetal Wilburn Syndrome     Plan:  Induction of labor:  - s/p 3x vaginal miso. Assess for bills following next dose.  - GBS negative; antibiotics not indicated   - Rh neg- rhogam evaluation PP, s/p rhogam at 28w; Rubella immune     #-Fetal Monosomy X on NIPT   - NICU present at delivery, with immediate echocardiogram and peds cards consult.   - Fetal CHD, coarctation on fetal echo  - Fetal skin edema     Fetal well-being  - Category 2 FHT for periods of recurrent late decels. Initial response to 500 ml bolus with resolution of decels, however now with another period of recurrent late decels. Will administer additional 500 ml bolus now. Continue with position changes as able.   Will continue to monitor closely. Reassess in 30 minutes.   - EFW 8# ; 3266g @ 36w3d on , AC >95%ile    Hx  2017 - 4.45kg (9lbs 13 oz)@ 40w2d     Li Avila MD  Obstetrics and Gynecology, PGY-2  06/15/20. 9:05 PM

## 2020-06-16 NOTE — DISCHARGE SUMMARY
[facility] Discharge Summary    Brooke Vidal MRN# [id number]   Age: 30 year old YOB: 1990     Date of Admission:  6/15/2020  Date of Discharge::  2020  Admitting Physician:  Cely Dangelo MD  Discharge Physician:  Emilia Ladd MD          Admission Diagnoses:   -IUP at 39w3d  -Fetal monosomy X on NIPT (Wilburn Syndrome)          Discharge Diagnosis:   -IUP at 39w3d, now delivered          Procedures:   Procedure(s): -            Medications Prior to Admission:     Medications Prior to Admission   Medication Sig Dispense Refill Last Dose     Prenatal Vit-Fe Fumarate-FA (PRENATAL MULTIVITAMIN PLUS IRON) 27-0.8 MG TABS per tablet Take 1 tablet by mouth daily   2020 at 2200     ferrous sulfate (IRON) 325 (65 FE) MG tablet Take 325 mg by mouth daily (with breakfast)        ibuprofen (ADVIL/MOTRIN) 600 MG tablet Take 1 tablet (600 mg) by mouth every 6 hours as needed for moderate pain 90 tablet 1              Discharge Medications:        Review of your medicines      START taking      Dose / Directions   * acetaminophen 325 MG tablet  Commonly known as:  TYLENOL  Used for:   (normal spontaneous vaginal delivery)      Dose:  325-650 mg  Take 1-2 tablets (325-650 mg) by mouth every 6 hours as needed for mild pain  Quantity:  40 tablet  Refills:  0     * acetaminophen 325 MG tablet  Commonly known as:  TYLENOL  Used for:   (normal spontaneous vaginal delivery)      Take 2 tablets by mouth every 6-8 hours as needed for pain.  Quantity:  30 tablet  Refills:  0     SENNA-docusate sodium 8.6-50 MG tablet  Commonly known as:  SENNA S  Used for:   (normal spontaneous vaginal delivery)      Dose:  1 tablet  Take 1 tablet by mouth At Bedtime  Quantity:  40 tablet  Refills:  0         * This list has 2 medication(s) that are the same as other medications prescribed for you. Read the directions carefully, and ask your doctor or other care provider to review them with  you.            CONTINUE these medicines which may have CHANGED, or have new prescriptions. If we are uncertain of the size of tablets/capsules you have at home, strength may be listed as something that might have changed.      Dose / Directions   * ibuprofen 600 MG tablet  Commonly known as:  ADVIL/MOTRIN  This may have changed:  Another medication with the same name was added. Make sure you understand how and when to take each.  Used for:   (normal spontaneous vaginal delivery)      Dose:  600 mg  Take 1 tablet (600 mg) by mouth every 6 hours as needed for moderate pain  Quantity:  40 tablet  Refills:  0     * ibuprofen 800 MG tablet  Commonly known as:  ADVIL/MOTRIN  This may have changed:  You were already taking a medication with the same name, and this prescription was added. Make sure you understand how and when to take each.  Used for:   (normal spontaneous vaginal delivery)      Dose:  800 mg  Take 1 tablet (800 mg) by mouth every 6 hours as needed for moderate pain or other (cramping)  Quantity:  30 tablet  Refills:  0         * This list has 2 medication(s) that are the same as other medications prescribed for you. Read the directions carefully, and ask your doctor or other care provider to review them with you.            CONTINUE these medicines which have NOT CHANGED      Dose / Directions   ferrous sulfate 325 (65 Fe) MG tablet  Commonly known as:  FEROSUL      Dose:  325 mg  Take 325 mg by mouth daily (with breakfast)  Refills:  0     prenatal multivitamin w/iron 27-0.8 MG tablet      Dose:  1 tablet  Take 1 tablet by mouth daily  Refills:  0           Where to get your medicines      These medications were sent to [facility] [place], [place] - [address]  [address] [address]    Phone:  [phone]     acetaminophen 325 MG tablet    acetaminophen 325 MG tablet    ibuprofen 600 MG tablet    ibuprofen 800 MG tablet    SENNA-docusate sodium 8.6-50  MG tablet                 Consultations:   NICU  Anesthesia          Brief Admission History   Brooke Vidal is a 30 year old  at 39w1d by LMP c/w 8w5 US who presents today for IOL for fetal Wilburn Syndrome.     She states that she is feeling well today.  She denies headache, vision changes, chest pain, shortness of breath, fever, chills, nausea, vomiting or other systemic complaints. She denies vaginal bleeding or loss of fluid and is feeling normal fetal movement         Brief Intrapartum Course:   Brooke Vidal, a 30 year old now  admitted for IOL. Labor was induced with vaginal misoprostol x3. She then proceeded to labor spontaneous. She underwent AROM for clear fluid. She progressed to complete. After a short second stage, she delivered a viable infant with apgars of 8  and 9 . Patient was fully dilated and pushing after 5  hours 8  minutes in active labor. Delivery was via vaginal, spontaneous  to a sterile field under epidural  anesthesia. Infant delivered in vertex  left  occiput  anterior  position. Anterior and posterior shoulders delivered without difficulty. The cord was clamped, cut twice and   were noted. Cord blood was obtained in routine fashion with the following disposition: lab .       Cord complications: none   Placenta delivered at 2020  4:13 AM . Placental disposition was Hospital disposal . Fundal massage performed and fundus found to be firm.      Episiotomy: none    Perineum, vagina, cervix were inspected, and the following lacerations were noted:   Perineal lacerations: 2nd degree laceration repaired in the standard fashion using 3-0 Vicryl, using 1% lidocaine for anesthetic.     Excellent hemostasis was noted. Needle count correct. Infant and patient in delivery room in good and stable condition.      Dr. Steven was present for the entire delivery and repair.           Hospital Course:   The patient's hospital course was unremarkable. She did not require rhogam as baby  was Rh negative. On discharge, her pain was well controlled. Vaginal bleeding is similar to peak menstrual flow.  Voiding without difficulty.  Ambulating well and tolerating a normal diet.  No fever. Infant is stable.  No bowel movement yet. She was discharged on post-partum day #1.    Post-partum hemoglobin: 10.6          Discharge Instructions and Follow-Up:   Discharge diet: Regular   Discharge activity: Pelvic rest for 6 weeks including no sexual intercourse, tampons, or douching.    Discharge follow-up: Follow up with your primary OB for a routine postpartum visit in 6 weeks           Discharge Disposition:   Discharged to home in stable condition      Ochoa Agrawal MD, MPH  OBGYN PGY-3  6/17/2020 5:24 PM     [facility] staff:  Appreciate note by Dr. Agrawal.  I have seen and examined the patient without the resident. I have reviewed, edited, and agree with the note.        Emilia Ladd MD, FACOG

## 2020-06-16 NOTE — PLAN OF CARE
The cervical ripening continue. See flow sheet for fetal and uterine monitoring. VSS, afebrile, patient still comfortable after the 3rd dose of Misoprostol. IV fluid bolus for recurrent late. . Provider notified. Will continue to monitor labor and notify the provider with changes.

## 2020-06-16 NOTE — PLAN OF CARE
Provider notification:  Provider Name: Dr. Tim SOSA. Notified at 0215 regarding a persistent category II fetal heart rate tracing for approximately 60 minutes.   Baseline rate 135, normal  Variability moderate and minimal   Accelerations present  Decelerations present, deceleration type: late , deceleration frequency: recurrent    EFM interpretation suggests concern for fetal metabolic acidemia at this time due to occasional minimal variability.    Uterine Activity uterine tachysystole.    Interventions to improve fetal oxygenation for a category II tracing include:maternal positioning, IV fluid bolus , increase frequency of assessment, consult Category 2 algorithm, evaluate labor progress, sterile vaginal exam and emotional support    After discussion with provider:Provider coming to bedside

## 2020-06-16 NOTE — PLAN OF CARE
Patient has been stable. Ambulating to NICU to see baby, states adequate pain control. Pumping independently. Continue with plan of care.

## 2020-06-16 NOTE — PLAN OF CARE
Patient arrived to [facility] unit via wheelchair at 0815 ,with belongings, accompanied by spouse/ significant other, infant in NICU. Received report from  Puja Metzger  and checked bands. Unit and room orientation completd. Call light given; no concerns present at this time. Continue with plan of care.

## 2020-06-16 NOTE — PLAN OF CARE
of viable Female with Dr. Steven and Dr. Avila in attandance.  NICU team present due to infant diagnosis of Turners Syndrome.  Infant with spontaneous cry, to mothers abdomen, dried and stimulated.  Apgars 8/9 .  Placenta delivered without complication, Pitocin bolused, 2nd degree laceration repaired, juanpablo cares provided.  Mother stable condition, baby sent to NICU.

## 2020-06-16 NOTE — PROGRESS NOTES
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 Posterior placenta. EFW 8#.      Plan:  #Induction of labor:  - S/p 3x vaginal miso. Now 3 cm. Suzanna frequently. Will reassess in ~4 hours for progress. Could consider AROM, though high fetal station currently.  - GBS negative; antibiotics not indicated   - Rh neg- rhogam evaluation PP, s/p rhogam at 28w; Rubella immune     #-Fetal Monosomy X on NIPT   - NICU present at delivery, with immediate echocardiogram and peds cards consult.   - Fetal CHD, coarctation on fetal echo  - Fetal skin edema     Fetal well-being  - Category 2 FHT but reactive with accels. Continue intrauterine resuscitation.  - EFW 8# ; 3266g @ 36w3d on , AC >95%ile      Hx  2017 - 4.45kg (9lbs 13 oz)@ 40w2d     Alisson Dumont MD  Obstetrics and Gynecology, PGY-2  06/15/20. 9:05 PM

## 2020-06-16 NOTE — PROVIDER NOTIFICATION
06/16/20 0136   Provider Notification   Provider Name/Title Dr. Dumont   Method of Notification In Department   Notification Reason Uterine Activity;Decels   Reviewed strip with provider due to decels and uterine tachysystole. Plan is to administer IV bolus of 250mL, if not reactive administer another 250mL. Will continue to reposition and monitor closely. Will update provider with any changes.

## 2020-06-16 NOTE — ANESTHESIA PREPROCEDURE EVALUATION
Anesthesia Pre-Procedure Evaluation    Patient: Brooke Vidal   MRN:     [id number] Gender:   female   Age:    30 year old :      1990        Preoperative Diagnosis: * No surgery found *        LABS:  CBC:   Lab Results   Component Value Date    HGB 10.9 (L) 06/15/2020     (L) 06/15/2020     BMP: No results found for: NA, POTASSIUM, CHLORIDE, CO2, BUN, CR, GLC  COAGS: No results found for: PTT, INR, FIBR  POC: No results found for: BGM, HCG, HCGS  OTHER: No results found for: PH, LACT, A1C, GWYN, PHOS, MAG, ALBUMIN, PROTTOTAL, ALT, AST, GGT, ALKPHOS, BILITOTAL, BILIDIRECT, LIPASE, AMYLASE, DINORA, TSH, T4, T3, CRP, SED     Preop Vitals    BP Readings from Last 3 Encounters:   06/15/20 123/76   20 113/75   20 107/73    Pulse Readings from Last 3 Encounters:   20 73   04/15/20 94   20 84      Resp Readings from Last 3 Encounters:   06/15/20 16   20 18   10/15/17 16    SpO2 Readings from Last 3 Encounters:   20 99%   10/13/17 98%      Temp Readings from Last 1 Encounters:   06/15/20 36.8  C (98.3  F) (Oral)    Ht Readings from Last 1 Encounters:   No data found for Ht      Wt Readings from Last 1 Encounters:   20 85.4 kg (188 lb 3.2 oz)    There is no height or weight on file to calculate BMI.     LDA:  Peripheral IV 06/15/20 Distal;Left Lower forearm (Active)   Site Assessment WDL 06/15/20 2200   Line Status Infusing 06/15/20 2200   Phlebitis Scale 0-->no symptoms 06/15/20 2200   Number of days: 0        No past medical history on file.   No past surgical history on file.   Not on File     Anesthesia Evaluation       history and physical reviewed .      No history of anesthetic complications          ROS/MED HX    ENT/Pulmonary:  - neg pulmonary ROS     Neurologic:  - neg neurologic ROS     Cardiovascular:  - neg cardiovascular ROS       METS/Exercise Tolerance:     Hematologic:         Musculoskeletal:         GI/Hepatic:  - neg GI/hepatic ROS        Renal/Genitourinary:         Endo:         Psychiatric:         Infectious Disease:         Malignancy:         Other:                     JPRISCAG FV AN PHYSICAL EXAM    JKARYN FV AN PLAN NO PONV RULE    neg OB HENRIQUE Fine MD

## 2020-06-16 NOTE — PROVIDER NOTIFICATION
06/16/20 0228   Provider Notification   Provider Name/Title Dr. Dumont   Method of Notification In Department   Notification Reason Decels   Notified provider of recurrent late decels and category two tracing. Reviewed strip together. Plan is to administer IV fluid bolus of 500mL. Continue to reposition. Provider plans to perform SVE soon. Will continue to monitor very closely.

## 2020-06-16 NOTE — L&D DELIVERY NOTE
OB Vaginal Delivery Note    Brooke Vidal MRN# [id number]   Age: 30 year old YOB: 1990     GA: 39w3d  GP:   Labor Complications: None   Delivery QBL:  675 cc  Delivery Type: Vaginal, Spontaneous   ROM to Delivery Time: (Delivered) Hours: 2 Minutes: 1   Weight: 3.83 kg (8 lb 7.1 oz)    1 Minute 5 Minute 10 Minute   Apgar Totals: 8   9        CYNTHIA STEVEN;EMILIANA PÉREZ;JONAS PARSONS     Delivery Details:  Brooke Vidal, a 30 year old now  admitted for IOL. Labor was induced with vaginal misoprostol x3. She then proceeded to labor spontaneous. She underwent AROM and progressed to complete. After a short second stage, she delivered a viable infant with apgars of 8  and 9 . Patient was fully dilated and pushing after 5  hours 8  minutes in active labor. Delivery was via vaginal, spontaneous  to a sterile field under epidural  anesthesia. Infant delivered in vertex  left  occiput  anterior  position. Anterior and posterior shoulders delivered without difficulty. The cord was clamped, cut twice and   were noted. Cord blood was obtained in routine fashion with the following disposition: lab .    Cord complications: none   Placenta delivered at 2020  4:13 AM . Placental disposition was Hospital disposal . Fundal massage performed and fundus found to be firm.   Episiotomy: none    Perineum, vagina, cervix were inspected, and the following lacerations were noted:   Perineal lacerations: 2nd degree laceration repaired in the standard fashion using 3-0 Vicryl, using 1% lidocaine for anesthetic.  Excellent hemostasis was noted. Needle count correct. Infant and patient in delivery room in good and stable condition.     Dr. Steven was present for the entire delivery and repair.    Jonas Parsons MD  Obstetrics and Gynecology, PGY-2  20. 5:32 AM     Labor Event Times    Labor onset date:  6/15/20 Onset time:  10:00 PM   Dilation complete date:  20 Complete time:    3:08 AM   Start pushing date/time:  2020 0357      Labor Length    1st Stage (hrs):  5 (min):  8   2nd Stage (hrs):  0 (min):  58   3rd Stage (hrs):  0 (min):  7      Labor Events     labor?:  No   steroids:  None  Labor Type:  Induction/Cervical ripening, AROM  Predominate monitoring during 1st stage:  continuous electronic fetal monitoring     Antibiotics received during labor?:  No     Rupture date/time: 20 0205   Rupture type:  Artificial Rupture of Membranes  Fluid color:  Clear, Bloody  Fluid odor:  Normal     Induction:  Misoprostol, AROM  Induction date/time: 6/15/20 1020   Cervical ripening date/time:     Indications for induction:  Fetal Abnormality     Additional Management:  IOL for Turners Syndrome   1:1 continuous labor support provided by?:  RN       Delivery/Placenta Date and Time    Delivery Date:  20 Delivery Time:   4:06 AM   Placenta Date/Time:  2020  4:13 AM  Oxytocin given at the time of delivery:  after delivery of baby     Vaginal Counts     Initial count performed by 2 team members:   Two Team Members   Renetta Alanis       Needles Suture [place] Sponges Instruments   Initial counts 2 0 5 0   Added to count 0 2 0    Final counts 2 2 5 0   Placed during labor Accounted for at the end of labor   NA NA   NA NA   NA NA    Final count performed by 2 team members:   Two Team Members   Wilfrid Avila      Final count correct?:  Yes     Apgars    Living status:  Living   1 Minute 5 Minute 10 Minute 15 Minute 20 Minute   Skin color: 0  1       Heart rate: 2  2       Reflex irritability: 2  2       Muscle tone: 2  2       Respiratory effort: 2  2       Total: 8  9       Apgars assigned by:  TIAN ROBB CNP     Cord     Complications:  None   Cord Blood Disposition:  Lab       Pierre Part Resuscitation    Methods:  Suctioning    Tian Corado RN, CNNP  2020  5:32 AM     Pierre [place] Measurements    Weight:  8 lb 7.1 oz       Skin to  Skin and Feeding Plan    Skin to skin initiation date/time: 1/6/1841    Skin to skin with:  Mother  Skin to skin end date/time:        Labor Events and Shoulder Dystocia    Fetal Tracing Prior to Delivery:  Category 2  Shoulder dystocia present?:  Neg     Delivery (Maternal) (Provider to Complete) (050606)    Episiotomy:  None  Perineal lacerations:  2nd Repaired?:  Yes      Blood Loss  Mother: Daniel Vidal #[id number]   Start of Mother's Information    IO Blood Loss  06/15/20 2200 - 06/16/20 0532    Delivery QBL (mL) Hospital Encounter 675 mL    Total  675 mL         End of Mother's Information  Mother: Daniel Vidal #[id number]         Delivery - Provider to Complete ([id number])    Delivering clinician:  Renetta Steven MD  Attempted Delivery Types (Choose all that apply):  Spontaneous Vaginal Delivery  Delivery Type (Choose the 1 that will go to the Birth History):  Vaginal, Spontaneous   Other personnel:   Provider Role   Renetta Steven MD MD Peterson, Karlye K, RN Delivery Nurse   Li Avila MD Resident         Placenta    Delayed Cord Clamping:  Done  Date/Time:  6/16/2020  4:13 AM  Removal:  Expressed  Disposition:  Hospital disposal     Anesthesia    Method:  Epidural  Cervical dilation at placement:  0-3          Presentation and Position    Presentation:  Vertex  Position:  Left Occiput Anterior         Li Avila MD     Staff MD Note  I was present and scrubbed for the entire procedure noted above.  I agree with the description above and any necessary changes have been made by me.  Renetta Steven MD

## 2020-06-16 NOTE — PLAN OF CARE
Patient transferred off unit to [facility] at 0640 via wheelchair to visit infant before going to [facility]. Patient in stable condition, IV pitocin still infusing. Patient experiencing intermittent nausea, IV zofran administered prior to leaving unit. Belongings sent with patient. JOAQUÍN Huston will transfer patient from NICU to [facility] once patient is done visiting with infant. Report given to receiving RN in [facility] JOAQUÍN Turk.

## 2020-06-17 VITALS
SYSTOLIC BLOOD PRESSURE: 125 MMHG | OXYGEN SATURATION: 99 % | RESPIRATION RATE: 16 BRPM | TEMPERATURE: 97.5 F | HEART RATE: 77 BPM | DIASTOLIC BLOOD PRESSURE: 88 MMHG

## 2020-06-17 LAB — HGB BLD-MCNC: 10.6 G/DL (ref 11.7–15.7)

## 2020-06-17 PROCEDURE — 25000132 ZZH RX MED GY IP 250 OP 250 PS 637: Performed by: STUDENT IN AN ORGANIZED HEALTH CARE EDUCATION/TRAINING PROGRAM

## 2020-06-17 PROCEDURE — 36415 COLL VENOUS BLD VENIPUNCTURE: CPT | Performed by: STUDENT IN AN ORGANIZED HEALTH CARE EDUCATION/TRAINING PROGRAM

## 2020-06-17 PROCEDURE — 85018 HEMOGLOBIN: CPT | Performed by: STUDENT IN AN ORGANIZED HEALTH CARE EDUCATION/TRAINING PROGRAM

## 2020-06-17 RX ORDER — IBUPROFEN 600 MG/1
600 TABLET, FILM COATED ORAL EVERY 6 HOURS PRN
Qty: 40 TABLET | Refills: 0 | Status: SHIPPED | OUTPATIENT
Start: 2020-06-17

## 2020-06-17 RX ORDER — ACETAMINOPHEN 325 MG/1
TABLET ORAL
Qty: 30 TABLET | Refills: 0 | Status: SHIPPED | OUTPATIENT
Start: 2020-06-17

## 2020-06-17 RX ORDER — IBUPROFEN 800 MG/1
800 TABLET, FILM COATED ORAL EVERY 6 HOURS PRN
Qty: 30 TABLET | Refills: 0 | Status: SHIPPED | OUTPATIENT
Start: 2020-06-17

## 2020-06-17 RX ORDER — ACETAMINOPHEN 325 MG/1
325-650 TABLET ORAL EVERY 6 HOURS PRN
Qty: 40 TABLET | Refills: 0 | Status: SHIPPED | OUTPATIENT
Start: 2020-06-17

## 2020-06-17 RX ORDER — SENNA AND DOCUSATE SODIUM 50; 8.6 MG/1; MG/1
1 TABLET, FILM COATED ORAL AT BEDTIME
Qty: 40 TABLET | Refills: 0 | Status: SHIPPED | OUTPATIENT
Start: 2020-06-17

## 2020-06-17 RX ADMIN — IBUPROFEN 800 MG: 800 TABLET, FILM COATED ORAL at 05:07

## 2020-06-17 RX ADMIN — ACETAMINOPHEN 650 MG: 325 TABLET, FILM COATED ORAL at 05:06

## 2020-06-17 RX ADMIN — ACETAMINOPHEN 650 MG: 325 TABLET, FILM COATED ORAL at 09:17

## 2020-06-17 RX ADMIN — DOCUSATE SODIUM AND SENNOSIDES 1 TABLET: 8.6; 5 TABLET, FILM COATED ORAL at 09:17

## 2020-06-17 RX ADMIN — ACETAMINOPHEN 650 MG: 325 TABLET, FILM COATED ORAL at 00:30

## 2020-06-17 RX ADMIN — ACETAMINOPHEN 650 MG: 325 TABLET, FILM COATED ORAL at 13:09

## 2020-06-17 RX ADMIN — IBUPROFEN 800 MG: 800 TABLET, FILM COATED ORAL at 11:13

## 2020-06-17 NOTE — PLAN OF CARE
Data: Vital signs within normal limits. Postpartum checks within normal limits - see flow record. Patient eating and drinking normally. Patient able to empty bladder independently and is up ambulating. No apparent signs of infection. Patient performing self cares and is able to ambulate to NICU to see infant.  Action: Patient medicated during the shift for cramping. See MAR. Patient reassessed within 1 hour after each medication and pain was improved - patient stated she was comfortable. Patient education done about discharge medications, discharge instructions, and discharge follow up plan. Patient states she has no other questions. See flow record.  Response: Positive attachment behaviors observed with infant. Support persons Blaine,  present.   Plan: Anticipate discharge this afternoon, patient went down to see infant once more and then will be discharged.

## 2020-06-17 NOTE — PROGRESS NOTES
[facility]  MATERNAL CHILD HEALTH SOCIAL WORK PROGRESS NOTE    SW collaborated with FOB/spouse (Blaine, e-mail: [email or web address]) and [facility] accommodations specialist. Plan for couple to transition from [facility] when pt is discharged to [facility].    SW will continue to assess needs and provide ongoing psychosocial support and access to appropriate resources/referrals. SW will continue to collaborate with the multidisciplinary team.    Dia Smallwood, MARCO, Northern Light Mayo HospitalSW  Clinical   Maternal Child Health  [facility]  Phone:   [phone]  Pager:    [phone]

## 2020-06-17 NOTE — PROGRESS NOTES
06/17/20 1325   Values Beliefs and Spiritual Care   C: Community: In support of your spiritual health, is there someone we may contact for you? (identify all that apply)   (shs/6.17)   Visit Information   Visit Made By Staff    Type of Visit Initial   Visited Patient   Interventions   Basic Spiritual Interventions    introduction/orientation to Spiritual Health Services;Assessment of spiritual needs/resources     SPIRITUAL HEALTH SERVICES: Tele-Encounter  Patient Location (Hospital, [facility] [place], Unit): [facility], , [facility]  Spoke with (patient, family relationship): Patient      Referral Source:  initiated due to baby in the NICU.     If applicable: patient was appropriately screened for telechaplaincy support with bedside nurse prior to visit (e.g. Mental Health and Addiction contexts). See call details below.    ILLNESS CIRCUMSTANCES:   Reviewed documentation. Reflective conversation shared with Daniel which integrated elements of illness and family narratives.   Context of Serious Illness/Symptom(s) - Daughter, Rebecca, in the NICU, suspected monosomy X   Resources for Support - Spouse, family, friends and rhonda community.    DISTRESS:   Emotional/Spiritual/Existential Distress - Expressed challenge of having daughter in NICU, wanting to be home, challenges of having 2 year old son at home. Daniel shared that it has been very helpful to have such good support from NICU team which is helping them cope and know Rebecca is receiving good care.    SPIRITUAL/Anglican COPING:   Restorationism/Rhonda - Identify as Hinduism  Spiritual Practice(s) - Prayer  Emotional/Relational/Existential Connections - Daniel focused on prayer and stated it is very meaningful that so many are praying for her. She requested prayer especially during Allkristen's surgery which may happen on Friday.    Daniel did not identify any spiritual needs at this time other than prayer.  She is open to follow-up visits either in the NICU or via tele-chaplaincy.  She  is aware that she can also request a visit from a  through NICU RN.       PLAN:  SHS will continue to follow.  SHS remains available for the duration of patient's hospitalization.     Chaplain Sara Molina    ______________________________    Type of service:  Telephone Visit     has received verbal consent for a TelephoneVisit from the patient? Yes    Distance Provider Location: designated [place] office or home office (secure setting)    Mode of Communication: telephone (via [employer] phone or [employer] tele-call-number ([phone]))

## 2020-06-17 NOTE — LACTATION NOTE
This note was copied from a baby's chart.  D:  I met with Daniel.  I:  I dispensed a Symphony and instructed her in its use.  I reviewed the new handout from the [facility] on keeping breast pumps/parts clean.  I helped with pumping and hand expression; she got a sizeable puddle.  We talked about logistics of lactation support on CVICU.  Her goal is establishing breastfeeding before bottling, but she stated Nicci hasn't started cueing yet.  A:  Mom has information and equipment she needs to initiate her supply and has updated cleaning guidelines  P:  Will continue to provide lactation support.    Valerie Soto, RNC, IBCLC

## 2020-06-17 NOTE — PROGRESS NOTES
Post Partum Progress Note  PPD#1    Subjective:  Patient seeing baby in the NICU. Having some cramping abdominal pain. Bleeding similar to menses. Denies nausea or vomiting. Ambulating without difficulty. Plans vasectomy for birth control. Would like to see lactation today regarding pumping.    Objective:  Vitals:    20 0823 20 1231 20 1641 20 2043   BP: 120/81 118/76 (!) 117/90 123/84   Pulse: 58 78 70 80   Resp:  16 16   Temp: 97.8  F (36.6  C)  98.3  F (36.8  C) 98.2  F (36.8  C)   TempSrc: Oral  Oral Oral   SpO2: 97% 99%       General: NAD, resting comfortably  Full exam not performed as patient is in NICU.    Assessment/Plan:  Brooke Vidal is a 30 year old  female who is PPD#1 s/p .    - Encourage routine post-operative goals including ambulation and incentive spirometry  - PNC: Rh negative. Follow up Rhogam eval.  - Rubella immune. No intervention indicated.  - Pain: controlled on oral medications  - Heme: Hgb 10.9>>  Hemoglobin   Date Value Ref Range Status   2020 10.6 (L) 11.7 - 15.7 g/dL Final     - GI: continue anti-emetics and stool softeners as needed.  - : Voiding spontaneously.  - Infant: In NICU  - Feeding: Plans on breast feeding. Pumping now  - BC: Plans on vasectomy.    Discharge to home today    Li Avila MD  Obstetrics and Gyncology, PGY-2  2020. 6:54 AM    [facility] staff:  Appreciate note by Dr. Avila.  I have seen and examined the patient without the resident. I have reviewed, edited, and agree with the note.      Emilia Ladd MD, FACOG  2020  1:28 PM

## 2020-06-17 NOTE — PLAN OF CARE
VSS. Uterine cramping controlled with motrin and tylenol.  Patient breast pumping and ambulating back and forth to NICU.  Voiding without problem.  Supportive spouse at bedside.

## 2020-06-20 ENCOUNTER — LACTATION ENCOUNTER (OUTPATIENT)
Age: 30
End: 2020-06-20

## 2020-06-20 NOTE — LACTATION NOTE
This note was copied from a baby's chart.  Called and spoke with bedside RN, mom has left for afternoon but says she is doing well, had no concerns with pumping. Offer lactation support as needed while inpatient, please call LC ascom *[phone] or if days with no postpartum lactation coverage, may try NICU LC at *[phone].

## 2020-06-24 ENCOUNTER — LACTATION ENCOUNTER (OUTPATIENT)
Age: 30
End: 2020-06-24

## 2020-06-24 NOTE — LACTATION NOTE
This note was copied from a baby's chart.  Follow up:    Met briefly with Brooke this afternoon before transfer to Unit 6. She shares that she was able to breastfeed Nicci this morning with SLP.    At this time the plan is for a swallow study with SLP and continued bottle feeding. Brooke is able to breastfeed 2x per day and is hopeful she'll soon be able to transition to exclusive breastfeeding.    Brooke was easily able to latch Nicci this morning. She felt she tired quickly but she did hear/see some swallowing at the breast. Per SLP note infant did well at the breast with no respiratory concerns.    Reviewed tips to maximize milk intake at the breast, plan to transition to feedings at the breast, inpatient breastfeeding support and benefits of continued pumping until Nicci able to demonstrate good milk intake at the breast.    Brooke was given LC Ascom *[phone] as well as NICU LC Ascom *[phone] to call for breastfeeding support.

## 2020-07-09 ENCOUNTER — MEDICAL CORRESPONDENCE (OUTPATIENT)
Dept: HEALTH INFORMATION MANAGEMENT | Facility: CLINIC | Age: 30
End: 2020-07-09

## 2020-07-30 NOTE — ADDENDUM NOTE
Addendum  created 07/30/20 0818 by Brigida Roque MD    Attestation recorded in Intraprocedure, Intraprocedure Attestations filed

## 2020-08-27 ENCOUNTER — MEDICAL CORRESPONDENCE (OUTPATIENT)
Dept: HEALTH INFORMATION MANAGEMENT | Facility: CLINIC | Age: 30
End: 2020-08-27

## 2021-03-25 ENCOUNTER — IMMUNIZATION (OUTPATIENT)
Dept: NURSING | Facility: CLINIC | Age: 31
End: 2021-03-25
Payer: COMMERCIAL

## 2021-03-25 PROCEDURE — 91303 PR COVID VAC JANSSEN AD26 0.5ML: CPT

## 2021-03-25 PROCEDURE — 0031A PR COVID VAC JANSSEN AD26 0.5ML: CPT

## 2021-04-25 ENCOUNTER — HEALTH MAINTENANCE LETTER (OUTPATIENT)
Age: 31
End: 2021-04-25

## 2021-10-09 ENCOUNTER — HEALTH MAINTENANCE LETTER (OUTPATIENT)
Age: 31
End: 2021-10-09

## 2022-05-21 ENCOUNTER — HEALTH MAINTENANCE LETTER (OUTPATIENT)
Age: 32
End: 2022-05-21

## 2022-09-17 ENCOUNTER — HEALTH MAINTENANCE LETTER (OUTPATIENT)
Age: 32
End: 2022-09-17

## 2022-09-26 ENCOUNTER — ALLIED HEALTH/NURSE VISIT (OUTPATIENT)
Dept: PEDIATRICS | Facility: CLINIC | Age: 32
End: 2022-09-26
Payer: COMMERCIAL

## 2022-09-26 DIAGNOSIS — Z23 ENCOUNTER FOR IMMUNIZATION: Primary | ICD-10-CM

## 2022-09-26 PROCEDURE — 90471 IMMUNIZATION ADMIN: CPT

## 2022-09-26 PROCEDURE — 90686 IIV4 VACC NO PRSV 0.5 ML IM: CPT

## 2023-06-04 ENCOUNTER — HEALTH MAINTENANCE LETTER (OUTPATIENT)
Age: 33
End: 2023-06-04

## 2024-07-13 ENCOUNTER — HEALTH MAINTENANCE LETTER (OUTPATIENT)
Age: 34
End: 2024-07-13

## 2025-07-19 ENCOUNTER — HEALTH MAINTENANCE LETTER (OUTPATIENT)
Age: 35
End: 2025-07-19